# Patient Record
Sex: FEMALE | Race: WHITE | NOT HISPANIC OR LATINO | Employment: FULL TIME | URBAN - METROPOLITAN AREA
[De-identification: names, ages, dates, MRNs, and addresses within clinical notes are randomized per-mention and may not be internally consistent; named-entity substitution may affect disease eponyms.]

---

## 2015-08-26 LAB — HCV AB SER-ACNC: NEGATIVE

## 2023-06-20 RX ORDER — AMLODIPINE BESYLATE 5 MG/1
TABLET ORAL
COMMUNITY
End: 2023-06-21 | Stop reason: SDUPTHER

## 2023-06-20 RX ORDER — ALBUTEROL SULFATE 90 UG/1
AEROSOL, METERED RESPIRATORY (INHALATION)
COMMUNITY
End: 2023-06-21 | Stop reason: SDUPTHER

## 2023-06-20 RX ORDER — MONTELUKAST SODIUM 10 MG/1
TABLET ORAL
COMMUNITY
End: 2023-06-21 | Stop reason: SDUPTHER

## 2023-06-20 RX ORDER — BECLOMETHASONE DIPROPIONATE HFA 40 UG/1
AEROSOL, METERED RESPIRATORY (INHALATION)
COMMUNITY
End: 2023-06-21 | Stop reason: ALTCHOICE

## 2023-06-21 ENCOUNTER — OFFICE VISIT (OUTPATIENT)
Age: 58
End: 2023-06-21

## 2023-06-21 VITALS
BODY MASS INDEX: 34.55 KG/M2 | DIASTOLIC BLOOD PRESSURE: 84 MMHG | HEIGHT: 66 IN | OXYGEN SATURATION: 98 % | HEART RATE: 68 BPM | WEIGHT: 215 LBS | SYSTOLIC BLOOD PRESSURE: 122 MMHG

## 2023-06-21 DIAGNOSIS — E03.9 HYPOTHYROIDISM, UNSPECIFIED TYPE: ICD-10-CM

## 2023-06-21 DIAGNOSIS — J45.20 MILD INTERMITTENT ASTHMA, UNSPECIFIED WHETHER COMPLICATED: ICD-10-CM

## 2023-06-21 DIAGNOSIS — I10 ESSENTIAL (PRIMARY) HYPERTENSION: ICD-10-CM

## 2023-06-21 DIAGNOSIS — K63.5 POLYP OF COLON, UNSPECIFIED PART OF COLON, UNSPECIFIED TYPE: ICD-10-CM

## 2023-06-21 DIAGNOSIS — Z00.00 ROUTINE GENERAL MEDICAL EXAMINATION AT A HEALTH CARE FACILITY: Primary | ICD-10-CM

## 2023-06-21 DIAGNOSIS — R73.01 IMPAIRED FASTING GLUCOSE: ICD-10-CM

## 2023-06-21 DIAGNOSIS — C50.912 INVASIVE DUCTAL CARCINOMA OF BREAST, FEMALE, LEFT (HCC): ICD-10-CM

## 2023-06-21 DIAGNOSIS — K76.0 FATTY LIVER DISEASE, NONALCOHOLIC: ICD-10-CM

## 2023-06-21 DIAGNOSIS — E78.2 MIXED HYPERLIPIDEMIA: ICD-10-CM

## 2023-06-21 DIAGNOSIS — R04.0 EPISTAXIS: ICD-10-CM

## 2023-06-21 DIAGNOSIS — E66.9 OBESITY (BMI 30.0-34.9): ICD-10-CM

## 2023-06-21 PROBLEM — E66.811 OBESITY (BMI 30.0-34.9): Status: ACTIVE | Noted: 2023-06-21

## 2023-06-21 PROBLEM — J45.909 ASTHMA: Status: ACTIVE | Noted: 2023-06-21

## 2023-06-21 PROBLEM — E78.5 HYPERLIPIDEMIA: Status: ACTIVE | Noted: 2023-06-21

## 2023-06-21 LAB — SL AMB POCT HEMOGLOBIN AIC: 5.5 (ref ?–6.5)

## 2023-06-21 PROCEDURE — 99386 PREV VISIT NEW AGE 40-64: CPT | Performed by: NURSE PRACTITIONER

## 2023-06-21 PROCEDURE — 83036 HEMOGLOBIN GLYCOSYLATED A1C: CPT | Performed by: NURSE PRACTITIONER

## 2023-06-21 RX ORDER — ALBUTEROL SULFATE 90 UG/1
2 AEROSOL, METERED RESPIRATORY (INHALATION) EVERY 6 HOURS PRN
Qty: 18 G | Refills: 0 | Status: SHIPPED | OUTPATIENT
Start: 2023-06-21

## 2023-06-21 RX ORDER — MONTELUKAST SODIUM 10 MG/1
10 TABLET ORAL
Qty: 90 TABLET | Refills: 1 | Status: SHIPPED | OUTPATIENT
Start: 2023-06-21

## 2023-06-21 RX ORDER — AMLODIPINE BESYLATE 5 MG/1
5 TABLET ORAL DAILY
Qty: 90 TABLET | Refills: 1 | Status: SHIPPED | OUTPATIENT
Start: 2023-06-21

## 2023-06-21 RX ORDER — LEVOTHYROXINE SODIUM 0.03 MG/1
25 TABLET ORAL DAILY
Qty: 90 TABLET | Refills: 1 | Status: SHIPPED | OUTPATIENT
Start: 2023-06-21

## 2023-06-21 NOTE — PATIENT INSTRUCTIONS
Wellness Visit for Adults   AMBULATORY CARE:   A wellness visit  is when you see your healthcare provider to get screened for health problems  Your healthcare provider will also give you advice on how to stay healthy  Write down your questions so you remember to ask them  Ask your healthcare provider how often you should have a wellness visit  What happens at a wellness visit:  Your healthcare provider will ask about your health, and your family history of health problems  This includes high blood pressure, heart disease, and cancer  He or she will ask if you have symptoms that concern you, if you smoke, and about your mood  You may also be asked about your intake of medicines, supplements, food, and alcohol  Any of the following may be done: Your weight  will be checked  Your height may also be checked so your body mass index (BMI) can be calculated  Your BMI shows if you are at a healthy weight  Your blood pressure  and heart rate will be checked  Your temperature may also be checked  Blood and urine tests  may be done  Blood tests may be done to check your cholesterol levels  Abnormal cholesterol levels increase your risk for heart disease and stroke  You may also need a blood or urine test to check for diabetes if you are at increased risk  Urine tests may be done to look for signs of an infection or kidney disease  A physical exam  includes checking your heartbeat and lungs with a stethoscope  Your healthcare provider may also check your skin to look for sun damage  Screening tests  may be recommended  A screening test is done to check for diseases that may not cause symptoms  The screening tests you may need depend on your age, gender, family history, and lifestyle habits  For example, colorectal screening may be recommended if you are 48years old or older  Screening tests you need if you are a woman:   A Pap smear  is used to screen for cervical cancer   Pap smears are usually done every 3 to 5 years depending on your age  You may need them more often if you have had abnormal Pap smear test results in the past  Ask your healthcare provider how often you should have a Pap smear  A mammogram  is an x-ray of your breasts to screen for breast cancer  Experts recommend mammograms every 2 years starting at age 48 years  You may need a mammogram at age 52 years or younger if you have an increased risk for breast cancer  Talk to your healthcare provider about when you should start having mammograms and how often you need them  Vaccines you may need:   Get an influenza vaccine  every year  The influenza vaccine protects you from the flu  Several types of viruses cause the flu  The viruses change over time, so new vaccines are made each year  Get a tetanus-diphtheria (Td) booster vaccine  every 10 years  This vaccine protects you against tetanus and diphtheria  Tetanus is a severe infection that may cause painful muscle spasms and lockjaw  Diphtheria is a severe bacterial infection that causes a thick covering in the back of your mouth and throat  Get a human papillomavirus (HPV) vaccine  if you are female and aged 23 to 32 or male 23 to 24 and never received it  This vaccine protects you from HPV infection  HPV is the most common infection spread by sexual contact  HPV may also cause vaginal, penile, and anal cancers  Get a pneumococcal vaccine  if you are aged 72 years or older  The pneumococcal vaccine is an injection given to protect you from pneumococcal disease  Pneumococcal disease is an infection caused by pneumococcal bacteria  The infection may cause pneumonia, meningitis, or an ear infection  Get a shingles vaccine  if you are 60 or older, even if you have had shingles before  The shingles vaccine is an injection to protect you from the varicella-zoster virus  This is the same virus that causes chickenpox   Shingles is a painful rash that develops in people who had chickenpox or have been exposed to the virus  How to eat healthy:  My Plate is a model for planning healthy meals  It shows the types and amounts of foods that should go on your plate  Fruits and vegetables make up about half of your plate, and grains and protein make up the other half  A serving of dairy is included on the side of your plate  The amount of calories and serving sizes you need depends on your age, gender, weight, and height  Examples of healthy foods are listed below:  Eat a variety of vegetables  such as dark green, red, and orange vegetables  You can also include canned vegetables low in sodium (salt) and frozen vegetables without added butter or sauces  Eat a variety of fresh fruits , canned fruit in 100% juice, frozen fruit, and dried fruit  Include whole grains  At least half of the grains you eat should be whole grains  Examples include whole-wheat bread, wheat pasta, brown rice, and whole-grain cereals such as oatmeal     Eat a variety of protein foods such as seafood (fish and shellfish), lean meat, and poultry without skin (turkey and chicken)  Examples of lean meats include pork leg, shoulder, or tenderloin, and beef round, sirloin, tenderloin, and extra lean ground beef  Other protein foods include eggs and egg substitutes, beans, peas, soy products, nuts, and seeds  Choose low-fat dairy products such as skim or 1% milk or low-fat yogurt, cheese, and cottage cheese  Limit unhealthy fats  such as butter, hard margarine, and shortening  Exercise:  Exercise at least 30 minutes per day on most days of the week  Some examples of exercise include walking, biking, dancing, and swimming  You can also fit in more physical activity by taking the stairs instead of the elevator or parking farther away from stores  Include muscle strengthening activities 2 days each week  Regular exercise provides many health benefits   It helps you manage your weight, and decreases your risk for type 2 diabetes, heart disease, stroke, and high blood pressure  Exercise can also help improve your mood  Ask your healthcare provider about the best exercise plan for you  General health and safety guidelines:   Do not smoke  Nicotine and other chemicals in cigarettes and cigars can cause lung damage  Ask your healthcare provider for information if you currently smoke and need help to quit  E-cigarettes or smokeless tobacco still contain nicotine  Talk to your healthcare provider before you use these products  Limit alcohol  A drink of alcohol is 12 ounces of beer, 5 ounces of wine, or 1½ ounces of liquor  Lose weight, if needed  Being overweight increases your risk of certain health conditions  These include heart disease, high blood pressure, type 2 diabetes, and certain types of cancer  Protect your skin  Do not sunbathe or use tanning beds  Use sunscreen with a SPF 15 or higher  Apply sunscreen at least 15 minutes before you go outside  Reapply sunscreen every 2 hours  Wear protective clothing, hats, and sunglasses when you are outside  Drive safely  Always wear your seatbelt  Make sure everyone in your car wears a seatbelt  A seatbelt can save your life if you are in an accident  Do not use your cell phone when you are driving  This could distract you and cause an accident  Pull over if you need to make a call or send a text message  Practice safe sex  Use latex condoms if are sexually active and have more than one partner  Your healthcare provider may recommend screening tests for sexually transmitted infections (STIs)  Wear helmets, lifejackets, and protective gear  Always wear a helmet when you ride a bike or motorcycle, go skiing, or play sports that could cause a head injury  Wear protective equipment when you play sports  Wear a lifejacket when you are on a boat or doing water sports      © Copyright [employer] 2022 Information is for End User's use only and may not be sold, redistributed or otherwise used for commercial purposes  The above information is an  only  It is not intended as medical advice for individual conditions or treatments  Talk to your doctor, nurse or pharmacist before following any medical regimen to see if it is safe and effective for you

## 2023-06-21 NOTE — ASSESSMENT & PLAN NOTE
Patient stopped taking Qvar, she reports it was irritating throat, she reports asthma is still controlled without Qvar, she notes rare use of albuterol

## 2023-06-21 NOTE — ASSESSMENT & PLAN NOTE
A1C 5 5 today  Goal to consume 500 to 1,000 fewer calories per day for a 1-2 lbs weight loss per week  Increase exercise to 30 min, 5 times a week as tolerated for a goal of 150 mins a week  Calorie tracking apps such as myfitness pal can be helpful for keeping track of calorie intake  Decrease simple carbohydrates (white bread, pasta, white rice), and increasing vegetables, fruit, and protein  Increase water

## 2023-06-21 NOTE — ASSESSMENT & PLAN NOTE
Patient here today for PE and the establish care  Patient has a hx of invasive ductal carcinoma of left breast  She is no longer following with breast specialist due to not wanting to take hormones  Patient has hx of HTN which is well controlled at this time  Pt has hx of asthma which is well controlled on albuterol, she was taking a daily inhaler, she reports throat irritation with Qvar so she is no longer taking the medication and feels controlled  Patient educated on diet and exercise goal    Patient did have labs completed in labs, labs were reviewed and were unremarkable

## 2023-06-21 NOTE — ASSESSMENT & PLAN NOTE
Controlled on Norvasc 5mg  Discussed low salt diet, increasing exercise to 30 mins 3-4x a week  Check home BP and record for next visit  BP goal <140/90  Discussed ER precautions for chest pain, stroke precautions, or BP of 180/120 or greater (hypertensive crisis), change in LOC or worsening symptoms  Call with new or worsening symptoms  If you have numbness, tingling, weakness, or severe headache with elevated BP go to the ED

## 2023-06-21 NOTE — PROGRESS NOTES
ADULT ANNUAL [facility] WITH [facility]    NAME: [place]  AGE: 59 y o  SEX: female  : 1965     DATE: 2023     Assessment and Plan:     Problem List Items Addressed This Visit        Digestive    Colon polyps     Last colonoscopy          Fatty liver disease, nonalcoholic     Encourage weight loss and exercise, follows with GI            Endocrine    Hypothyroid     TSH normal , continue Synthroid 25mcg             Relevant Medications    levothyroxine (Synthroid) 25 mcg tablet    Impaired fasting glucose     A1C 5 5 today            Respiratory    Asthma     Patient stopped taking Qvar, she reports it was irritating throat, she reports asthma is still controlled without Qvar, she notes rare use of albuterol         Relevant Medications    montelukast (SINGULAIR) 10 mg tablet    albuterol (ProAir HFA) 90 mcg/act inhaler       Cardiovascular and Mediastinum    Essential (primary) hypertension     Controlled on Norvasc 5mg  Discussed low salt diet, increasing exercise to 30 mins 3-4x a week  Check home BP and record for next visit  BP goal <140/90  Discussed ER precautions for chest pain, stroke precautions, or BP of 180/120 or greater (hypertensive crisis), change in LOC or worsening symptoms  Call with new or worsening symptoms  If you have numbness, tingling, weakness, or severe headache with elevated BP go to the ED  Relevant Medications    amLODIPine (NORVASC) 5 mg tablet       Other    Epistaxis     She saw ENT and had nasal cauterization  Hyperlipidemia     Controlled on diet and exercise  Invasive ductal carcinoma of breast, female, left [facility])     Patient no longer following with cancer center, she did not want to take hormones  Obesity (BMI 30 0-34  9)     A1C 5 5 today  Goal to consume 500 to 1,000 fewer calories per day for a 1-2 lbs weight loss per week  Increase exercise to 30 min, 5 times a week as tolerated for a goal of 150 mins a week  Calorie tracking apps such as myfitness pal can be helpful for keeping track of calorie intake  Decrease simple carbohydrates (white bread, pasta, white rice), and increasing vegetables, fruit, and protein  Increase water  Relevant Orders    POCT hemoglobin A1c (Completed)    Routine general medical examination at a health care facility - Primary     Patient here today for PE and the establish care  Patient has a hx of invasive ductal carcinoma of left breast  She is no longer following with breast specialist due to not wanting to take hormones  Patient has hx of HTN which is well controlled at this time  Pt has hx of asthma which is well controlled on albuterol, she was taking a daily inhaler, she reports throat irritation with Qvar so she is no longer taking the medication and feels controlled  Patient educated on diet and exercise goal    Patient did have labs completed in labs, labs were reviewed and were unremarkable  Immunizations and preventive care screenings were discussed with patient today  Appropriate education was printed on patient's after visit summary  Counseling:  Alcohol/drug use: discussed moderation in alcohol intake, the recommendations for healthy alcohol use, and avoidance of illicit drug use  Dental Health: discussed importance of regular tooth brushing, flossing, and dental visits  Injury prevention: discussed safety/seat belts, safety helmets, smoke detectors, carbon dioxide detectors, and smoking near bedding or upholstery  Sexual health: discussed sexually transmitted diseases, partner selection, use of condoms, avoidance of unintended pregnancy, and contraceptive alternatives  Exercise: the importance of regular exercise/physical activity was discussed  Recommend exercise 3-5 times per week for at least 30 minutes  BMI Counseling:  Body mass index is 34 7 kg/m²  The BMI is above normal  Nutrition recommendations include decreasing portion sizes, encouraging healthy choices of fruits and vegetables, decreasing fast food intake, consuming healthier snacks, limiting drinks that contain sugar, moderation in carbohydrate intake, increasing intake of lean protein, reducing intake of saturated and trans fat and reducing intake of cholesterol  Exercise recommendations include moderate physical activity 150 minutes/week  Rationale for BMI follow-up plan is due to patient being overweight or obese  Depression Screening and Follow-up Plan: Patient was screened for depression during today's encounter  They screened negative with a PHQ-2 score of 0  Return in about 6 months (around 12/21/2023) for HTN  Chief Complaint:     Chief Complaint   Patient presents with   • Establish Care   • Annual Exam      History of Present Illness:     Adult Annual Physical   Patient here for a comprehensive physical exam  The patient reports no problems  Diet and Physical Activity  Diet/Nutrition: well balanced diet  Exercise: 3-4 times a week on average  Depression Screening  PHQ-2/9 Depression Screening    Little interest or pleasure in doing things: 0 - not at all  Feeling down, depressed, or hopeless: 0 - not at all  PHQ-2 Score: 0  PHQ-2 Interpretation: Negative depression screen       General Health  Sleep: gets 4-6 hours of sleep on average and gets 7-8 hours of sleep on average  Hearing: normal - bilateral   Vision: most recent eye exam <1 year ago and wears contacts  Dental: regular dental visits and brushes teeth twice daily  /GYN Health  Patient is: postmenopausal  Contraceptive method: Post menopausal      Review of Systems:     Review of Systems   Constitutional: Negative  HENT: Negative  Eyes: Negative  Respiratory: Negative  Cardiovascular: Negative  Gastrointestinal: Negative  Genitourinary: Negative      Musculoskeletal: Negative  Skin: Negative  Neurological: Negative  Hematological: Negative  Past Medical History:     Past Medical History:   Diagnosis Date   • Allergic Most my life   • Arthritis After breaking my right leg in 2008   • Asthma Most of my life   • Cancer [facility]) February 2019    Left Breast   • Disease of thyroid gland For the past couple of decades   • Heart murmur 1980    Benign Heart Murmur   • Hypertension Most of my adult life   • Obesity 2008   • Otitis media Frequent when I was a child   • Urinary tract infection Frequent when I was a child & a young adult   • Visual impairment High School Teenager    Nearsighted      Past Surgical History:     Past Surgical History:   Procedure Laterality Date   • BREAST SURGERY  May 2019    Partial masectomy of left breast   • FRACTURE SURGERY  May 2008    Right Leg   • LYMPH NODE BIOPSY  May 2019    Due to Breast Cancer - Left      Social History:     Social History     Socioeconomic History   • Marital status: /Civil Union     Spouse name: None   • Number of children: None   • Years of education: None   • Highest education level: None   Occupational History   • None   Tobacco Use   • Smoking status: Never     Passive exposure: Yes   • Smokeless tobacco: Never   • Tobacco comments: Only smoked a cigarette here or there when I was a teenager & young adult  Substance and Sexual Activity   • Alcohol use: Yes     Alcohol/week: 2 0 standard drinks of alcohol     Types: 1 Glasses of wine, 1 Standard drinks or equivalent per week     Comment: Only have a drink or two on the weekends  • Drug use: Not Currently     Types: Marijuana     Comment: Just smoked marijuana a few times in my teenage years     • Sexual activity: Yes     Partners: Male     Birth control/protection: Post-menopausal   Other Topics Concern   • None   Social History Narrative   • None     Social Determinants of Health     Financial Resource Strain: Not on file   Food Insecurity: Not on file   Transportation Needs: Not on file   Physical Activity: Not on file   Stress: Not on file   Social Connections: Not on file   Intimate Partner Violence: Not on file   Housing Stability: Not on file      Family History:     Family History   Problem Relation Age of Onset   • Alcohol abuse Mother    • Mental illness Mother         Manic-Depressive   • Coronary artery disease Mother    • Hypertension Mother    • Heart disease Mother         Missing a valve   • Diabetes Mother         Type 2   • COPD Mother    • Cancer Mother         Skin, lungs, pancreas   • Hearing loss Mother    • Anxiety disorder Mother    • Mental illness Father         Paranoid Schizophrenia   • Depression Father    • Hypertension Father    • Heart disease Father         Pacemaker   • Cancer Father         Bone   • Anxiety disorder Father    • Schizophrenia Father    • Suicide Attempts Father    • Alcohol abuse Maternal Grandfather    • Cancer Maternal Grandfather         Brain   • Hypertension Maternal Grandmother    • Colon cancer Maternal Grandmother    • Cancer Maternal Grandmother         Colon   • Glaucoma Maternal Grandmother    • Heart disease Paternal Grandfather         Heart Attack   • Alcohol abuse Brother    • Substance Abuse Brother    • Hypertension Brother    • Heart disease Brother         Slow heart rate   • Diabetes Brother         Type 2   • Alcohol abuse Son    • Mental illness Son         Anxiety/Depression   • Hypertension Son    • Heart disease Son         POTS   • Autoimmune disease Son         POTS   • Alcohol abuse Maternal Uncle    • Mental illness Sister         Anxiety/Depression   • Asthma Sister    • Anxiety disorder Sister    • Heart disease Paternal Uncle         Heart Attack   • Asthma Daughter    • Autoimmune disease Cousin         Fibromyalga   • Breast cancer Cousin    • Breast cancer Maternal Aunt    • Breast cancer Paternal Aunt       Current Medications:     Current Outpatient Medications   Medication "Sig Dispense Refill   • albuterol (ProAir HFA) 90 mcg/act inhaler Inhale 2 puffs every 6 (six) hours as needed for wheezing 18 g 0   • amLODIPine (NORVASC) 5 mg tablet Take 1 tablet (5 mg total) by mouth daily 90 tablet 1   • levothyroxine (Synthroid) 25 mcg tablet Take 1 tablet (25 mcg total) by mouth daily 90 tablet 1   • montelukast (SINGULAIR) 10 mg tablet Take 1 tablet (10 mg total) by mouth daily at bedtime 90 tablet 1     No current facility-administered medications for this visit  Allergies: Allergies   Allergen Reactions   • Covid-19 Mrna Vacc (Moderna) Chest Pain, Dizziness, Drowsiness, Fatigue, Itching, Lightheadedness, Palpitations, Rash, Shortness Of Breath and Throat Swelling   • Dust Mite Extract Drowsiness, Fatigue, Itching, Rash, Shortness Of Breath and Throat Swelling   • Hydrocodone-Acetaminophen Dizziness, Lightheadedness, Palpitations and Shortness Of Breath   • Influenza Virus Vaccine Chest Pain, Dizziness, Fatigue, Itching, Lightheadedness, Palpitations, Rash and Shortness Of Breath   • Medical Tape Drowsiness, Hives, Itching, Rash and Shortness Of Breath   • Molds & Smuts Fatigue, Itching, Lightheadedness, Nasal Congestion, Rash, Shortness Of Breath and Throat Swelling   • Morphine Vomiting      Physical Exam:     /84 (BP Location: Right arm, Patient Position: Sitting, Cuff Size: Standard)   Pulse 68   Ht 5' 6\" (1 676 m)   Wt 97 5 kg (215 lb)   SpO2 98%   BMI 34 70 kg/m²     Physical Exam  Vitals and nursing note reviewed  Constitutional:       General: She is not in acute distress  Appearance: Normal appearance  She is well-developed  HENT:      Head: Normocephalic and atraumatic  Right Ear: Tympanic membrane, ear canal and external ear normal       Left Ear: Tympanic membrane, ear canal and external ear normal       Nose: Nose normal       Mouth/Throat:      Mouth: Mucous membranes are moist       Pharynx: No oropharyngeal exudate     Eyes:      General:    " Right eye: No discharge  Left eye: No discharge  Conjunctiva/sclera: Conjunctivae normal    Cardiovascular:      Rate and Rhythm: Normal rate and regular rhythm  Heart sounds: Normal heart sounds  No murmur heard  Pulmonary:      Effort: Pulmonary effort is normal  No respiratory distress  Breath sounds: Normal breath sounds  Abdominal:      General: Bowel sounds are normal       Palpations: Abdomen is soft  Tenderness: There is no abdominal tenderness  Musculoskeletal:         General: No swelling  Cervical back: Neck supple  Right lower leg: No edema  Left lower leg: No edema  Skin:     General: Skin is warm and dry  Capillary Refill: Capillary refill takes less than 2 seconds  Neurological:      Mental Status: She is alert and oriented to person, place, and time  Psychiatric:         Mood and Affect: Mood normal          Behavior: Behavior normal          Thought Content:  Thought content normal          Judgment: Judgment normal           [facility]  [id number] WITH [facility]

## 2023-07-05 ENCOUNTER — TELEPHONE (OUTPATIENT)
Dept: ADMINISTRATIVE | Facility: OTHER | Age: 58
End: 2023-07-05

## 2023-07-05 NOTE — TELEPHONE ENCOUNTER
----- Message from Hellen Kathleen sent at 7/5/2023 12:18 PM EDT -----  Regarding: care gap request  01/04/23 1:48 PM    Hello, our patient attached above CRC: Colonoscopy completed/performed. Please assist in updating the patient chart by pulling the document from the Media Tab. The date of service is 6/30/23.      Thank you,  Hellen Kathleen  [facility]

## 2023-07-05 NOTE — TELEPHONE ENCOUNTER
Upon review of the In Basket request we have found/obtained the documentation. After careful review of the document we are unable to complete this request for CRC: Colonoscopy because b there is no colonoscopy report, there is only a patholgy report and the colonoscopy was completed in 2012 which we would not outreach for due to colonoscopy being out if the measurement year. Any additional questions or concerns should be emailed to the Practice Liaisons via the appropriate education email address, please do not reply via In Basket.     Thank you  Gerry Donis

## 2023-07-14 PROBLEM — D22.62: Status: ACTIVE | Noted: 2023-07-14

## 2023-07-14 PROBLEM — C44.91 BASAL CELL CARCINOMA: Status: ACTIVE | Noted: 2023-07-14

## 2023-07-15 DIAGNOSIS — J45.20 MILD INTERMITTENT ASTHMA, UNSPECIFIED WHETHER COMPLICATED: ICD-10-CM

## 2023-07-15 RX ORDER — ALBUTEROL SULFATE 90 UG/1
AEROSOL, METERED RESPIRATORY (INHALATION)
Qty: 18 G | Refills: 0 | Status: SHIPPED | OUTPATIENT
Start: 2023-07-15

## 2023-08-11 DIAGNOSIS — J45.20 MILD INTERMITTENT ASTHMA, UNSPECIFIED WHETHER COMPLICATED: ICD-10-CM

## 2023-08-11 RX ORDER — ALBUTEROL SULFATE 90 UG/1
AEROSOL, METERED RESPIRATORY (INHALATION)
OUTPATIENT
Start: 2023-08-11

## 2023-08-11 RX ORDER — ALBUTEROL SULFATE 90 UG/1
2 AEROSOL, METERED RESPIRATORY (INHALATION) EVERY 6 HOURS PRN
Qty: 18 G | Refills: 0 | Status: SHIPPED | OUTPATIENT
Start: 2023-08-11

## 2023-08-11 NOTE — TELEPHONE ENCOUNTER
Refilled albuterol. Patient was called and voicemail left for her to return call to discuss overuse of albuterol. I would like to see patient to discuss a better maintainece medication for her asthma for better long term outcomes.

## 2023-08-20 PROBLEM — Z00.00 ROUTINE GENERAL MEDICAL EXAMINATION AT A HEALTH CARE FACILITY: Status: RESOLVED | Noted: 2023-06-21 | Resolved: 2023-08-20

## 2023-12-06 ENCOUNTER — TELEMEDICINE (OUTPATIENT)
Age: 58
End: 2023-12-06

## 2023-12-06 DIAGNOSIS — U07.1 COVID-19: Primary | ICD-10-CM

## 2023-12-06 PROCEDURE — 99213 OFFICE O/P EST LOW 20 MIN: CPT | Performed by: NURSE PRACTITIONER

## 2023-12-06 NOTE — PATIENT INSTRUCTIONS
COVID-19 (Coronavirus Disease 2019)   WHAT YOU NEED TO KNOW:   COVID-19 is the disease caused by a coronavirus first discovered in December 2019. The virus can be spread starting 2 to 3 days before symptoms begin. The virus has changed into several new forms (called variants) since it was discovered. A variant may be more easily spread or cause more severe illness than the original form. DISCHARGE INSTRUCTIONS:   Call your local emergency number (911 in the [address]) if:   You have trouble breathing or shortness of breath at rest.    You have chest pain or pressure that lasts longer than 5 minutes. You become confused or hard to wake. Return to the emergency department if:   The skin on your face, fingers, or toes look blue or darker than usual.      Call your doctor if:   You have a fever. You have questions or concerns about your condition or care. Medicines: You may need any of the following:  Decongestants  help reduce nasal congestion and help you breathe more easily. If you take decongestant pills, they may make you feel restless or cause problems with your sleep. Do not use decongestant sprays for more than a few days. Cough suppressants  help reduce coughing. Ask your healthcare provider which type of cough medicine is best for you. Acetaminophen  decreases pain and fever. It is available without a doctor's order. Ask how much to take and how often to take it. Follow directions. Read the labels of all other medicines you are using to see if they also contain acetaminophen, or ask your doctor or pharmacist. Acetaminophen can cause liver damage if not taken correctly. NSAIDs , such as ibuprofen, help decrease swelling, pain, and fever. This medicine is available with or without a doctor's order. NSAIDs can cause stomach bleeding or kidney problems in certain people. If you take blood thinner medicine, always ask your healthcare provider if NSAIDs are safe for you.  Always read the medicine label and follow directions. Blood thinners  help prevent blood clots. Clots can cause strokes, heart attacks, and death. Many types of blood thinners are available. Your healthcare provider will give you specific instructions for the type you are given. The following are general safety guidelines to follow while you are taking a blood thinner:    Watch for bleeding and bruising. Watch for bleeding from your gums or nose. Watch for blood in your urine and bowel movements. Use a soft washcloth on your skin, and a soft toothbrush to brush your teeth. This can keep your skin and gums from bleeding. If you shave, use an electric shaver. Do not play contact sports. Tell your dentist and other healthcare providers that you take a blood thinner. Wear a bracelet or necklace that says you take this medicine. Do not start or stop any other medicines or supplements unless your healthcare provider tells you to. Many medicines and supplements cannot be used with blood thinners. Take your blood thinner exactly as prescribed by your healthcare provider. Do not skip does or take less than prescribed. Tell your provider right away if you forget to take your blood thinner, or if you take too much. Take your medicine as directed. Contact your healthcare provider if you think your medicine is not helping or if you have side effects. Tell your provider if you are allergic to any medicine. Keep a list of the medicines, vitamins, and herbs you take. Include the amounts, and when and why you take them. Bring the list or the pill bottles to follow-up visits. Carry your medicine list with you in case of an emergency. What you need to know about COVID-19 vaccines:  Healthcare providers recommend vaccination, even if you already had COVID-19. Get a COVID-19 vaccine as directed. Vaccination is recommended for everyone 6 months or older. COVID-19 vaccines are given as a shot in 1 to 3 doses as a primary series.  This depends on the vaccine brand and the age of the person who receives it. A booster dose is recommended for everyone 5 years or older after the primary series is complete. A second booster  is recommended for all adults 48 or older and for immunocompromised adolescents. The second booster is also recommended for anyone who got the 1-dose brand of vaccine for the first dose and a booster. Your provider can give you more information on boosters and help you schedule all needed doses. Continue to protect yourself and others. You can become infected even after you get the vaccine. You may also be able to pass the virus to others without knowing you are infected. After you get the vaccine, check local, national, and international travel rules. You may need to be tested before you travel. Some countries require proof of a negative test before you travel. You may also need to quarantine after you return. Medicine may be given to prevent infection. The medicine can be given if you are at high risk for infection and cannot get the vaccine. It can also be given if your immune system does not respond well to the vaccine. How the 2019 coronavirus spreads:  Close personal contact with an infected person increases your risk for infection. This means being within 6 feet (2 meters) of the person for at least 15 minutes over 24 hours. The virus travels in droplets that form when a person talks, sings, coughs, or sneezes. The droplets can also float in the air for minutes or hours. Infection happens when you breathe in the droplets or get them in your eyes or nose. Person-to-person contact can spread the virus. For example, a person with the virus on his or her hands can spread it by shaking hands with someone. The virus can stay on objects and surfaces for hours to days. You may become infected by touching the object or surface and then touching your eyes or mouth.     Help lower your risk for COVID-19 during an active outbreak:   Stay home if you are sick or think you may have COVID-19. It is important to stay home if you are waiting for a testing appointment or for test results. Wash your hands often throughout the day. Use soap and water. Rub your soapy hands together, lacing your fingers, for at least 20 seconds. Rinse with warm, running water. Dry your hands with a clean towel or paper towel. Use hand  that contains alcohol if soap and water are not available. Teach children how to wash their hands and use hand . Cover sneezes and coughs. Turn your face away and cover your mouth and nose with a tissue. Throw the tissue away. Use the bend of your arm if a tissue is not available. Then wash your hands well with soap and water or use hand . Teach children how to cover a cough or sneeze. Wear a face covering (mask) when needed. Use a cloth covering with at least 2 layers. You can also create layers by putting a cloth covering over a disposable non-medical mask. Cover your mouth and your nose. Try to keep space between you and others when you are out of the house. Avoid crowds as much as possible. Wear a face covering when you must be around a large group and cannot keep space between you and others. Clean and disinfect high-touch surfaces and objects often. Use disinfecting wipes, or make a solution of 4 teaspoons of bleach in 1 quart (4 cups) of water. Ask about other vaccines you may need. Get the influenza (flu) vaccine as soon as recommended each year, usually starting in September or October. Get the pneumonia vaccine if recommended. Your healthcare provider can tell you if you should also get other vaccines, and when to get them. Follow up with your doctor as directed:  Write down your questions so you remember to ask them during your visits.   For more information:   [facility]  [address] [address]  [place] [address]  Phone: [phone]  Web Address: Sqoot.br    © Copyright [employer]ative 2023 Information is for End User's use only and may not be sold, redistributed or otherwise used for commercial purposes. The above information is an  only. It is not intended as medical advice for individual conditions or treatments. Talk to your doctor, nurse or pharmacist before following any medical regimen to see if it is safe and effective for you.

## 2023-12-06 NOTE — ASSESSMENT & PLAN NOTE
Patient tested positive for COVID 1 day ago. We discussed SE and use of Paxlovid/antivirals but she declines medication at this time. She has been taking Tylenol, daytime cold and flu and night time cold and flu as needed for sx. She denies SOB, chest pain. She denies needing albuterol inhaler. We discussed quarantine guidelines and masking. We discussed sx which warrant emergent f/u. Patient verbalized understanding and is in agreement with plan. Please call the office with new or worsening sx. Work note provided.

## 2023-12-06 NOTE — LETTER
December 6, 2023     Patient: Jovan Casas  YOB: 1965  Date of Visit: 12/6/2023      To Whom it May Concern:    Jovan Casas is under my professional care. Wilmon Hammans was seen in my office on 12/6/2023. Wilmon Hammans may return to work on 12/11/2023 . If you have any questions or concerns, please don't hesitate to call.          Sincerely,          [facility] Childers        CC: No Recipients

## 2023-12-06 NOTE — PROGRESS NOTES
COVID-19 Outpatient Progress Note    Assessment/Plan:    Problem List Items Addressed This Visit        Other    COVID-19 - Primary     Patient tested positive for COVID 1 day ago. We discussed SE and use of Paxlovid/antivirals but she declines medication at this time. She has been taking Tylenol, daytime cold and flu and night time cold and flu as needed for sx. She denies SOB, chest pain. She denies needing albuterol inhaler. We discussed quarantine guidelines and masking. We discussed sx which warrant emergent f/u. Patient verbalized understanding and is in agreement with plan. Please call the office with new or worsening sx. Work note provided. Disposition:     Discussed symptom directed medication options with patient. Discussed vitamin D, vitamin C, and/or zinc supplementation with patient. I have spent a total time of 18 minutes on the day of the encounter for this patient including discussing diagnostic results, discussing prognosis and risks and benefits of treatment options. Encounter provider: NGOC Simons     Provider located at: [place] [address]  [phone]     Recent Visits  No visits were found meeting these conditions. Showing recent visits within past 7 days and meeting all other requirements  Today's Visits  Date Type Provider Dept   12/06/23 Telemedicine Summer Kaufman, [facility]   Showing today's visits and meeting all other requirements  Future Appointments  No visits were found meeting these conditions. Showing future appointments within next 150 days and meeting all other requirements     This virtual check-in was done via [address] [place] [address] and patient was informed that this is a secure, HIPAA-compliant platform. She agrees to proceed.     Patient agrees to participate in a virtual check in via telephone or video visit instead of presenting to the office to address urgent/immediate medical needs. Patient is aware this is a billable service. She acknowledged consent and understanding of privacy and security of the video platform. The patient has agreed to participate and understands they can discontinue the visit at any time. After connecting through [facility], the patient was identified by name and date of birth. Елена Ham was informed that this was a telemedicine visit and that the exam was being conducted confidentially over secure lines. My office door was closed. No one else was in the room. Елена Ham acknowledged consent and understanding of privacy and security of the telemedicine visit. I informed the patient that I have reviewed her record in Epic and presented the opportunity for her to ask any questions regarding the visit today. The patient agreed to participate. Verification of patient location:  Patient is located in the following state in which I hold an active license: [place]    Subjective:   Елена Ham is a 59 y.o. female who is concerned about COVID-19. Patient's symptoms include fever, chills, fatigue, nasal congestion, sore throat, cough, shortness of breath and chest tightness. Patient denies malaise, rhinorrhea, anosmia, loss of taste, abdominal pain, nausea, vomiting, diarrhea, myalgias and headaches. - Date of symptom onset: 12/4/2023      COVID-19 vaccination status: Fully vaccinated (primary series)    Lab Results   Component Value Date    SARSCOV2 Detected (A) 08/02/2021       Review of Systems   Constitutional:  Positive for chills, fatigue and fever. HENT:  Positive for congestion and sore throat. Negative for rhinorrhea. Respiratory:  Positive for cough, chest tightness and shortness of breath. Gastrointestinal:  Negative for abdominal pain, diarrhea, nausea and vomiting. Musculoskeletal:  Negative for myalgias. Neurological:  Negative for headaches.      Current Outpatient Medications on File Prior to Visit   Medication Sig   • albuterol (PROVENTIL HFA,VENTOLIN HFA) 90 mcg/act inhaler Inhale 2 puffs every 6 (six) hours as needed for wheezing or shortness of breath   • amLODIPine (NORVASC) 5 mg tablet Take 1 tablet (5 mg total) by mouth daily   • levothyroxine (Synthroid) 25 mcg tablet Take 1 tablet (25 mcg total) by mouth daily   • montelukast (SINGULAIR) 10 mg tablet Take 1 tablet (10 mg total) by mouth daily at bedtime       Objective: There were no vitals taken for this visit. Physical Exam  Constitutional:       General: She is not in acute distress. Appearance: Normal appearance. She is ill-appearing. She is not toxic-appearing or diaphoretic. HENT:      Head: Normocephalic and atraumatic. Eyes:      General:         Right eye: No discharge. Left eye: No discharge. Pulmonary:      Effort: Pulmonary effort is normal. No respiratory distress. Skin:     Coloration: Skin is not jaundiced or pale. Findings: No bruising, erythema, lesion or rash. Neurological:      Mental Status: She is alert and oriented to person, place, and time. Psychiatric:         Mood and Affect: Mood normal.         Behavior: Behavior normal.         Thought Content:  Thought content normal.         Judgment: Judgment normal.       NGOC Linda

## 2024-01-10 DIAGNOSIS — I10 ESSENTIAL (PRIMARY) HYPERTENSION: ICD-10-CM

## 2024-01-10 RX ORDER — AMLODIPINE BESYLATE 5 MG/1
5 TABLET ORAL DAILY
Qty: 90 TABLET | Refills: 0 | Status: SHIPPED | OUTPATIENT
Start: 2024-01-10

## 2024-01-17 DIAGNOSIS — E03.9 HYPOTHYROIDISM, UNSPECIFIED TYPE: ICD-10-CM

## 2024-01-17 RX ORDER — LEVOTHYROXINE SODIUM 0.03 MG/1
25 TABLET ORAL DAILY
Qty: 90 TABLET | Refills: 1 | Status: SHIPPED | OUTPATIENT
Start: 2024-01-17

## 2024-01-23 ENCOUNTER — OFFICE VISIT (OUTPATIENT)
Age: 59
End: 2024-01-23

## 2024-01-23 VITALS
HEIGHT: 66 IN | OXYGEN SATURATION: 98 % | BODY MASS INDEX: 34.04 KG/M2 | HEART RATE: 57 BPM | SYSTOLIC BLOOD PRESSURE: 118 MMHG | DIASTOLIC BLOOD PRESSURE: 62 MMHG | TEMPERATURE: 98.5 F | WEIGHT: 211.8 LBS

## 2024-01-23 DIAGNOSIS — I10 ESSENTIAL (PRIMARY) HYPERTENSION: Primary | ICD-10-CM

## 2024-01-23 DIAGNOSIS — J45.20 MILD INTERMITTENT ASTHMA, UNSPECIFIED WHETHER COMPLICATED: ICD-10-CM

## 2024-01-23 DIAGNOSIS — Z12.31 SCREENING MAMMOGRAM, ENCOUNTER FOR: ICD-10-CM

## 2024-01-23 DIAGNOSIS — Z12.4 CERVICAL CANCER SCREENING: ICD-10-CM

## 2024-01-23 DIAGNOSIS — M25.562 CHRONIC PAIN OF LEFT KNEE: ICD-10-CM

## 2024-01-23 DIAGNOSIS — G89.29 CHRONIC PAIN OF LEFT KNEE: ICD-10-CM

## 2024-01-23 DIAGNOSIS — E66.9 OBESITY (BMI 30.0-34.9): ICD-10-CM

## 2024-01-23 DIAGNOSIS — C50.912 INVASIVE DUCTAL CARCINOMA OF BREAST, FEMALE, LEFT (HCC): ICD-10-CM

## 2024-01-23 DIAGNOSIS — E03.9 HYPOTHYROIDISM, UNSPECIFIED TYPE: ICD-10-CM

## 2024-01-23 PROCEDURE — 99214 OFFICE O/P EST MOD 30 MIN: CPT | Performed by: NURSE PRACTITIONER

## 2024-01-23 NOTE — ASSESSMENT & PLAN NOTE
Left medial knee pain for past few months only occurring at night time. During day it is tender to the touch, but not pain. At night pain is a throbbing pain which wakes her up out of her sleep.  Normal range of motion noted on exam today.  Point tenderness noted.  Will order x-ray to rule out bony abnormalities, and refer to physical therapy.  Advised patient on Voltaren gel as needed before bed.

## 2024-01-24 NOTE — ASSESSMENT & PLAN NOTE
Blood pressure well-controlled at this time on amlodipine 5 mg.  Discussed low salt diet, increasing exercise to 30 mins 3-4x a week. Check home BP and record for next visit. BP goal <140/90. Discussed ER precautions for chest pain, stroke precautions, or BP of 180/120 or greater (hypertensive crisis), change in LOC or worsening symptoms. Call with new or worsening symptoms.   If you have numbness, tingling, weakness, or severe headache with elevated BP go to the ED.

## 2024-01-24 NOTE — PROGRESS NOTES
Name: Pam Montalvo      : 1965      MRN: [id number]  Encounter Provider: NGOC Bates  Encounter Date: 2024   Encounter department: [facility]    Assessment & Plan     1. Essential (primary) hypertension  Assessment & Plan:  Blood pressure well-controlled at this time on amlodipine 5 mg.  Discussed low salt diet, increasing exercise to 30 mins 3-4x a week. Check home BP and record for next visit. BP goal <140/90. Discussed ER precautions for chest pain, stroke precautions, or BP of 180/120 or greater (hypertensive crisis), change in LOC or worsening symptoms. Call with new or worsening symptoms.   If you have numbness, tingling, weakness, or severe headache with elevated BP go to the ED.        2. Chronic pain of left knee  Assessment & Plan:  Left medial knee pain for past few months only occurring at night time. During day it is tender to the touch, but not pain. At night pain is a throbbing pain which wakes her up out of her sleep.  Normal range of motion noted on exam today.  Point tenderness noted.  Will order x-ray to rule out bony abnormalities, and refer to physical therapy.  Advised patient on Voltaren gel as needed before bed.    Orders:  -     XR knee 3 vw left non injury  -     Ambulatory Referral to Physical Therapy; Future    3. Invasive ductal carcinoma of breast, female, left (HCC)  -     Mammo diagnostic right w cad; Future; Expected date: 2024  -     Mammo diagnostic left w cad; Future; Expected date: 2024  -     US breast right limited (diagnostic); Future; Expected date: 2024  -     US breast left limited (diagnostic); Future; Expected date: 2024    4. Screening mammogram, encounter for  -     Mammo screening bilateral w 3d & cad; Future; Expected date: 2024  -     Mammo diagnostic right w cad; Future; Expected date: 2024  -     Mammo diagnostic left w cad; Future;  "Expected date: 01/23/2024  -     US breast right limited (diagnostic); Future; Expected date: 01/23/2024  -     US breast left limited (diagnostic); Future; Expected date: 01/23/2024    5. Cervical cancer screening  -     Ambulatory Referral to Obstetrics / Gynecology; Future    6. Hypothyroidism, unspecified type  Assessment & Plan:  Patient currently taking Synthroid 25 mg.  No concerns at this time.  TSH in normal range.      7. Mild intermittent asthma, unspecified whether complicated  Assessment & Plan:  Well-controlled rare use of albuterol, and Singulair daily.      8. Obesity (BMI 30.0-34.9)  Assessment & Plan:  Goal to consume 500 to 1,000 fewer calories per day for a 1-2 lbs weight loss per week. Increase exercise to 30 min, 5 times a week as tolerated for a goal of 150 mins a week. Calorie tracking apps such as myfitness pal can be helpful for keeping track of calorie intake. Decrease simple carbohydrates (white bread, pasta, white rice), and increasing vegetables, fruit, and protein.  Increase water.               Subjective      Patient here today for follow-up.  Patient has concerns for medial left knee pain which occurs at night.  Patient reports left knee pain is a throbbing pain which wakes her up from sleep.  Patient denies injury to knee.  Patient reports throughout the day she has point tenderness on the medial left knee, but at night she has throbbing sharp pain.  Patient has not taken any medication for pain.  Patient reports this has been occurring for the past \"few months \".      Review of Systems   Constitutional: Negative.    HENT: Negative.     Eyes: Negative.    Respiratory: Negative.     Cardiovascular: Negative.    Gastrointestinal: Negative.    Endocrine: Negative.    Genitourinary: Negative.    Musculoskeletal:  Negative for back pain, gait problem, myalgias, neck pain and neck stiffness.        Left knee pain   Skin: Negative.    Allergic/Immunologic: Negative.    Neurological: " "Negative.    Hematological: Negative.    Psychiatric/Behavioral: Negative.         Current Outpatient Medications on File Prior to Visit   Medication Sig   • albuterol (PROVENTIL HFA,VENTOLIN HFA) 90 mcg/act inhaler Inhale 2 puffs every 6 (six) hours as needed for wheezing or shortness of breath   • amLODIPine (NORVASC) 5 mg tablet Take 1 tablet (5 mg total) by mouth daily   • levothyroxine (Synthroid) 25 mcg tablet Take 1 tablet (25 mcg total) by mouth daily   • montelukast (SINGULAIR) 10 mg tablet Take 1 tablet (10 mg total) by mouth daily at bedtime       Objective     /62 (BP Location: Right arm, Patient Position: Sitting, Cuff Size: Large)   Pulse 57   Temp 98.5 °F (36.9 °C)   Ht 5' 6\" (1.676 m)   Wt 96.1 kg (211 lb 12.8 oz)   SpO2 98%   BMI 34.19 kg/m²     Physical Exam  Vitals and nursing note reviewed.   Constitutional:       General: She is not in acute distress.     Appearance: Normal appearance.   HENT:      Head: Normocephalic and atraumatic.      Right Ear: External ear normal.      Left Ear: External ear normal.      Nose: Nose normal.   Eyes:      General:         Right eye: No discharge.         Left eye: No discharge.      Conjunctiva/sclera: Conjunctivae normal.   Cardiovascular:      Rate and Rhythm: Normal rate and regular rhythm.      Heart sounds: Normal heart sounds. No murmur heard.  Pulmonary:      Effort: Pulmonary effort is normal.      Breath sounds: Normal breath sounds.   Abdominal:      General: Bowel sounds are normal.      Palpations: Abdomen is soft.   Musculoskeletal:         General: Normal range of motion.      Cervical back: Normal range of motion.      Right knee: Normal.      Left knee: No swelling, effusion, erythema or bony tenderness. Normal range of motion. Tenderness present over the medial joint line.      Instability Tests: Anterior drawer test negative. Posterior drawer test negative. Anterior Lachman test negative. Medial Paula test negative.      Right " lower leg: No edema.      Left lower leg: No edema.   Lymphadenopathy:      Cervical: No cervical adenopathy.   Skin:     General: Skin is warm and dry.   Neurological:      Mental Status: She is alert and oriented to person, place, and time.   Psychiatric:         Mood and Affect: Mood normal.         Behavior: Behavior normal.         Thought Content: Thought content normal.         Judgment: Judgment normal.       [facility]OC Bates

## 2024-02-27 ENCOUNTER — EVALUATION (OUTPATIENT)
Age: 59
End: 2024-02-27
Payer: COMMERCIAL

## 2024-02-27 DIAGNOSIS — G89.29 CHRONIC PAIN OF LEFT KNEE: ICD-10-CM

## 2024-02-27 DIAGNOSIS — M25.562 CHRONIC PAIN OF LEFT KNEE: ICD-10-CM

## 2024-02-27 PROCEDURE — 97110 THERAPEUTIC EXERCISES: CPT | Performed by: PHYSICAL THERAPIST

## 2024-02-27 PROCEDURE — 97161 PT EVAL LOW COMPLEX 20 MIN: CPT | Performed by: PHYSICAL THERAPIST

## 2024-02-27 NOTE — PROGRESS NOTES
PT Evaluation     Today's date: 2024  Patient name: Pam Montalvo  : 1965  MRN: [id number]  Referring provider: Crystal Hoyt*  Dx:   Encounter Diagnosis     ICD-10-CM    1. Chronic pain of left knee  M25.562 Ambulatory Referral to Physical Therapy    G89.29           Start Time: 1615  Stop Time: 1715  Total time in clinic (min): 60 minutes    Assessment  Assessment details: Pam Montalvo presents to PT with chronic Hx of insidious onset L knee pain. Significant findings from examination include: mild extension deficit, TTP medial joint line, strong/painful contractile testing. These findings are consistent with referring Dx of medial knee DJD, limiting their functional mobility. Pt would benefit from course of PT to resolve the above mentioned impairments and facilitate return to PLOF.     Impairments: abnormal or restricted ROM, impaired physical strength, lacks appropriate home exercise program and pain with function    Symptom irritability: moderateUnderstanding of Dx/Px/POC: good   Prognosis: good    Goals  Short Term Functional Goals:   In 3 weeks patient will:   Patient to be independent with HEP to maximize improvement in functional mobility.   Decrease L Knee pain to 3 on a scale of 0-10 to allow dressing and improved stair climbing.    pt will increase FOTO score by 10pts to reflect a statistically significant improvement.        Long Term Functional Goals (Goals for patient at discharge):    In 6 weeks patient will:   Improve functional mobility: Patient will stand for 90 minutes and walk 2 blocks with less than 3/10 pain.   Patient will ascend/descend 2 flight of stairs with less than 2/10 pain.   pt will score at or above predicted discharge FOTO score of 80 to reflect improvement in subjective functional ability.       Plan  Patient would benefit from: skilled physical therapy  Referral necessary: No  Planned therapy interventions: manual therapy, neuromuscular re-education,  "patient education, therapeutic activities, therapeutic exercise and home exercise program  Frequency: 2x week  Duration in weeks: 6  Plan of Care beginning date: 2024  Plan of Care expiration date: 2024  Treatment plan discussed with: patient      Subjective Evaluation    History of Present Illness  Date of onset: 2023  Mechanism of injury: Pam Montalvo is a 58 y.o. y/o female who reports chronic Hx of L knee pain at night which started gradually over months s clear onset. CC is pain at night disturbing sleep. Symptoms aggravated by getting up from floor and low chairs, walking, and stairs. Symptoms improved by moving.   Symptoms change throughout the day: Yes (worsening)   Relevant Surgical Hx: none  Pacemaker:  No   Cancer: Yes (Breast)     Patient Goals  Patient goals for therapy: decreased pain, independence with ADLs/IADLs and return to sport/leisure activities    Pain  Current pain ratin  At best pain ratin  At worst pain ratin  Quality: burning and throbbing  Relieving factors: change in position  Aggravating factors: stair climbing  Progression: worsening      Diagnostic Tests  X-ray: abnormal (Medial compartment DJD)        Objective     Tenderness   Left Knee   Tenderness in the medial joint line.     Passive Range of Motion   Left Knee   Flexion: 125 degrees   Extension: -5 degrees with pain    Right Knee   Flexion: 130 degrees   Extension: 0 degrees     Strength/Myotome Testing     Left Knee   Left knee flexion strength: 55lb.  Left knee extension strength: 65lb.    Right Knee   Right knee flexion strength: 54lb.  Right knee extension strength: 65lb.    Additional Strength Details  30\" sq test - 10 reps    Tests     Left Knee   Negative lateral Paula, medial Paula, valgus stress test at 0 degrees, valgus stress test at 30 degrees, varus stress test at 0 degrees and varus stress test at 30 degrees.              Precautions: Hx of CA     Daily Treatment Diary:      Initial " "Evaluation Date: 02/27/24  Compliance 2/27                     Visit Number 1                    Re-Eval  IE                 MC   Foto Captured y                           2/27                     Manual                                                                                        Ther-Ex                      PROM                      [place]/QS                      4-way SLR X10 ea                     Prone TKE 10x5\"            Straight Leg bridge                      Std Hip ABD/Ext                      Partial Sq 2x10                                                                                       Edu                      Neuro Re-Ed                      SLS Balance                      SLS A/P pivot             Lat Step Down 8in   x10                                      Ther-Act                                                               Modalities                                                                              "

## 2024-03-07 ENCOUNTER — OFFICE VISIT (OUTPATIENT)
Age: 59
End: 2024-03-07
Payer: COMMERCIAL

## 2024-03-07 DIAGNOSIS — M25.562 CHRONIC PAIN OF LEFT KNEE: Primary | ICD-10-CM

## 2024-03-07 DIAGNOSIS — G89.29 CHRONIC PAIN OF LEFT KNEE: Primary | ICD-10-CM

## 2024-03-07 PROCEDURE — 97112 NEUROMUSCULAR REEDUCATION: CPT | Performed by: PHYSICAL THERAPIST

## 2024-03-07 PROCEDURE — 97110 THERAPEUTIC EXERCISES: CPT | Performed by: PHYSICAL THERAPIST

## 2024-03-07 NOTE — PROGRESS NOTES
"Daily Note     Today's date: 3/7/2024  Patient name: Pam Montalvo  : 1965  MRN: [id number]  Referring provider: Crystal Hoyt*  Dx:   Encounter Diagnosis     ICD-10-CM    1. Chronic pain of left knee  M25.562     G89.29           Start Time: 1630  Stop Time: 1715  Total time in clinic (min): 45 minutes    Subjective: Pt reports no sig change from IE. Following HEP. Had increased pain on  so she took Monday off from exercise. Pain reported as 2/10 at start of session.         Objective: See treatment diary below        Assessment: pt demonstrates good initial response to POC. Exercises added to improve LE strength and activity tolerance.  Pt would benefit from continued skilled PT to resolve remaining functional impairments and facilitate return to PLOF.       Plan: Continue per plan of care.  Progress treatment as tolerated.         Precautions: Hx of CA     Daily Treatment Diary:      Initial Evaluation Date: 24  Compliance 2/27  3/7                   Visit Number 1 2                   Re-Eval  IE                 MC   Foto Captured y                           2/27  3/7                   Manual                                                                                        Ther-Ex                      PROM                                            4-way SLR X10 ea  2# 2x10 ea                   Prone TKE 10x5\" 2x10 5\" ea           Straight Leg bridge   Gurpreet 2x10 5\" ea                   Std Hip ABD/Ext   2x10 ea (B)                   Partial Sq 2x10  3x10                                                                                     Edu                      Neuro Re-Ed                      SLS Balance   10x10\"                   SLS A/P pivot             Lat Step Down 8in   x10 6in 3x10                                      Ther-Act                                                               Modalities                                                                     "

## 2024-03-09 DIAGNOSIS — J45.20 MILD INTERMITTENT ASTHMA, UNSPECIFIED WHETHER COMPLICATED: ICD-10-CM

## 2024-03-10 RX ORDER — MONTELUKAST SODIUM 10 MG/1
10 TABLET ORAL
Qty: 90 TABLET | Refills: 1 | Status: SHIPPED | OUTPATIENT
Start: 2024-03-10

## 2024-03-11 DIAGNOSIS — Z12.31 SCREENING MAMMOGRAM, ENCOUNTER FOR: ICD-10-CM

## 2024-03-12 ENCOUNTER — OFFICE VISIT (OUTPATIENT)
Age: 59
End: 2024-03-12
Payer: COMMERCIAL

## 2024-03-12 DIAGNOSIS — M25.562 CHRONIC PAIN OF LEFT KNEE: Primary | ICD-10-CM

## 2024-03-12 DIAGNOSIS — G89.29 CHRONIC PAIN OF LEFT KNEE: Primary | ICD-10-CM

## 2024-03-12 PROCEDURE — 97112 NEUROMUSCULAR REEDUCATION: CPT | Performed by: PHYSICAL THERAPIST

## 2024-03-12 PROCEDURE — 97110 THERAPEUTIC EXERCISES: CPT | Performed by: PHYSICAL THERAPIST

## 2024-03-12 NOTE — PROGRESS NOTES
"Daily Note     Today's date: 3/12/2024  Patient name: Pam Montalvo  : 1965  MRN: [id number]  Referring provider: Crystal Hoyt*  Dx:   Encounter Diagnosis     ICD-10-CM    1. Chronic pain of left knee  M25.562     G89.29             Start Time: 1630  Stop Time: 1720  Total time in clinic (min): 50 minutes    Subjective: Pt reports felling pretty good today. Pain reported as 2/10 at start of session.         Objective: See treatment diary below        Assessment: pt demonstrates good initial response to POC. Load added to improve LE strength and activity tolerance where tolerated. Stretching for IT band added and shown for HEP. Pt would benefit from continued skilled PT to resolve remaining functional impairments and facilitate return to PLOF.       Plan: Continue per plan of care.  Progress treatment as tolerated.         Precautions: Hx of CA     Daily Treatment Diary:      Initial Evaluation Date: 24  Compliance 2/27  3/7  3/12                 Visit Number 1 2  3                 Re-Eval  IE                 MC   Foto Captured y                           2/27  3/7  3/12                 Manual                                                                                        Ther-Ex                      PROM                      ITB stretch     6x20\"                 4-way SLR X10 ea  2# 2x10 ea  3# 2x10 ea                 Prone TKE 10x5\" 2x10 5\" ea 2x10 5\" ea          Straight Leg bridge   Gurpreet 2x10 5\" ea  Gurpreet 2x10 5\" ea                 Std Hip ABD/Ext   2x10 ea (B)  3# 2x10 ea (B)                 Partial Sq 2x10  3x10  Bench c 1 pad  3x10                                                                                   Edu                      Neuro Re-Ed                      SLS Balance   10x10\"  10x10\"                 SLS A/P pivot             Lat Step Down 8in   x10 6in 3x10  6in 3x10                                     Ther-Act                                                          "      Modalities

## 2024-03-14 ENCOUNTER — OFFICE VISIT (OUTPATIENT)
Age: 59
End: 2024-03-14
Payer: COMMERCIAL

## 2024-03-14 DIAGNOSIS — G89.29 CHRONIC PAIN OF LEFT KNEE: Primary | ICD-10-CM

## 2024-03-14 DIAGNOSIS — M25.562 CHRONIC PAIN OF LEFT KNEE: Primary | ICD-10-CM

## 2024-03-14 PROCEDURE — 97110 THERAPEUTIC EXERCISES: CPT | Performed by: PHYSICAL THERAPIST

## 2024-03-14 PROCEDURE — 97112 NEUROMUSCULAR REEDUCATION: CPT | Performed by: PHYSICAL THERAPIST

## 2024-03-15 NOTE — PROGRESS NOTES
"Daily Note     Today's date: 3/15/2024  Patient name: Pam Montalvo  : 1965  MRN: [id number]  Referring provider: Crystal Hoyt*  Dx:   Encounter Diagnosis     ICD-10-CM    1. Chronic pain of left knee  M25.562     G89.29             Start Time: 1630  Stop Time: 1725  Total time in clinic (min): 55 minutes    Subjective: Pt reports pain during day improved. Pain still wakes her most nights. Pain reported as 2/10 at start of session.         Objective: See treatment diary below        Assessment: pt demonstrates good initial response to POC. Pt better able to control ecc knee motion on step down. Load added to improve LE strength and activity tolerance where tolerated. Pt would benefit from continued skilled PT to resolve remaining functional impairments and facilitate return to PLOF.       Plan: Continue per plan of care.  Progress treatment as tolerated.         Precautions: Hx of CA     Daily Treatment Diary:      Initial Evaluation Date: 24  Compliance 2/27  3/7  3/12  3/14               Visit Number 1 2  3  4               Re-Eval  IE                 MC   Foto Captured y                           2/27  3/7  3/12  3/14               Manual                                                                                        Ther-Ex                      Active warm-up    AROM  Bike 5min         PROM                      ITB stretch     6x20\"  6x20\"               4-way SLR X10 ea  2# 2x10 ea  3# 2x10 ea  3# 2x10 ea               Prone TKE 10x5\" 2x10 5\" ea 2x10 5\" ea 2x10 5\" ea         Sup bridge   Gurpreet 2x10 5\" ea  Gurpreet 2x10 5\" ea  Gurpreet 2x10 5\" ea               Std Hip ABD/Ext   2x10 ea (B)  3# 2x10 ea (B)  3# 2x10 ea (B)               Partial Sq 2x10  3x10  Bench c 1 pad  3x10  Bench c 1 pad  3x10                                                                                 Edu                      Neuro Re-Ed                      SLS Balance   10x10\"  10x10\"  10x10\"               SLS A/P " pivot             Lat Step Down 8in   x10 6in 3x10  6in 3x10  6in 3x10                                    Ther-Act                                                               Modalities

## 2024-03-19 ENCOUNTER — OFFICE VISIT (OUTPATIENT)
Age: 59
End: 2024-03-19
Payer: COMMERCIAL

## 2024-03-19 DIAGNOSIS — G89.29 CHRONIC PAIN OF LEFT KNEE: Primary | ICD-10-CM

## 2024-03-19 DIAGNOSIS — M25.562 CHRONIC PAIN OF LEFT KNEE: Primary | ICD-10-CM

## 2024-03-19 PROCEDURE — 97110 THERAPEUTIC EXERCISES: CPT | Performed by: PHYSICAL THERAPIST

## 2024-03-19 PROCEDURE — 97112 NEUROMUSCULAR REEDUCATION: CPT | Performed by: PHYSICAL THERAPIST

## 2024-03-19 NOTE — PROGRESS NOTES
"Daily Note     Today's date: 3/19/2024  Patient name: Pam Montalvo  : 1965  MRN: [id number]  Referring provider: Crystal Hoyt*  Dx:   Encounter Diagnosis     ICD-10-CM    1. Chronic pain of left knee  M25.562     G89.29             Start Time: 1630  Stop Time: 1725  Total time in clinic (min): 55 minutes    Subjective: Pt reports sleeping well night of last session. Woke each night since at least 1x. Pain reported as 2/10 at start of session.         Objective: See treatment diary below        Assessment: pt demonstrates good initial response to POC. Stretching into knee Ext perf to increase TKE during stance and terminal phases of gait. Pt better able to control ecc knee motion on step down. Pt would benefit from continued skilled PT to resolve remaining functional impairments and facilitate return to PLOF.       Plan: Continue per plan of care.  Progress treatment as tolerated.         Precautions: Hx of CA     Daily Treatment Diary:      Initial Evaluation Date: 24  Compliance 2/27  3/7  3/12  3/14  3/19             Visit Number 1 2  3  4  5             Re-Eval  IE                 MC   Foto Captured y                           2/27  3/7  3/12  3/14  3/19             Manual                                                                                        Ther-Ex                      Active warm-up    AROM  Bike 5min AROM  Bike 5min        PROM         5'             ITB stretch     6x20\"  6x20\"  6x20\"             4-way SLR X10 ea  2# 2x10 ea  3# 2x10 ea  3# 2x10 ea  3# 2x10 ea             Prone TKE 10x5\" 2x10 5\" ea 2x10 5\" ea 2x10 5\" ea 2x10 5\" ea        Sup bridge   Gurpreet 2x10 5\" ea  Gurpreet 2x10 5\" ea  Gurpreet 2x10 5\" ea  Gurpreet 2x10 5\" ea             Std Hip ABD/Ext   2x10 ea (B)  3# 2x10 ea (B)  3# 2x10 ea (B)  3# 2x10 ea (B)             Partial Sq 2x10  3x10  Bench c 1 pad  3x10  Bench c 1 pad  3x10  Bench c 1 pad  3x10                                                                         " "      Edu                      Neuro Re-Ed                      SLS Balance   10x10\"  10x10\"  10x10\"  10x10\"             SLS A/P pivot             Lat Step Down 8in   x10 6in 3x10  6in 3x10  6in 3x10  8in 3x10                                   Ther-Act                                                               Modalities                                                                              "

## 2024-03-21 ENCOUNTER — OFFICE VISIT (OUTPATIENT)
Age: 59
End: 2024-03-21
Payer: COMMERCIAL

## 2024-03-21 DIAGNOSIS — M25.562 CHRONIC PAIN OF LEFT KNEE: Primary | ICD-10-CM

## 2024-03-21 DIAGNOSIS — G89.29 CHRONIC PAIN OF LEFT KNEE: Primary | ICD-10-CM

## 2024-03-21 PROCEDURE — 97110 THERAPEUTIC EXERCISES: CPT | Performed by: PHYSICAL THERAPIST

## 2024-03-21 PROCEDURE — 97112 NEUROMUSCULAR REEDUCATION: CPT | Performed by: PHYSICAL THERAPIST

## 2024-03-21 NOTE — PROGRESS NOTES
"Daily Note     Today's date: 3/21/2024  Patient name: Pam Montalvo  : 1965  MRN: [id number]  Referring provider: Crystal Hoyt*  Dx:   Encounter Diagnosis     ICD-10-CM    1. Chronic pain of left knee  M25.562     G89.29             Start Time: 1630  Stop Time: 1725  Total time in clinic (min): 55 minutes    Subjective: Pt reports aching on patella following last session. Pain reported as 2/10 at start of session.         Objective: See treatment diary below        Assessment: pt demonstrates good initial response to POC. Stretching into knee Ext perf to increase TKE during stance and terminal phases of gait. Was able to achieve 5* knee hyper extension for first time today. Pt better able to control ecc knee motion on step down. Pt would benefit from continued skilled PT to resolve remaining functional impairments and facilitate return to PLOF.       Plan: Continue per plan of care.  Progress treatment as tolerated.         Precautions: Hx of CA     Daily Treatment Diary:      Initial Evaluation Date: 24  Compliance 2/27  3/7  3/12  3/14  3/19  3/21           Visit Number 1 2  3  4  5  6           Re-Eval  IE                 MC   Foto Captured y                           2/27  3/7  3/12  3/14  3/19  3/21           Manual                                                                                        Ther-Ex                      Active warm-up    AROM  Bike 5min AROM  Bike 5min AROM  Bike 5min       PROM         5'  5'           ITB stretch     6x20\"  6x20\"  6x20\"  6x20\"           4-way SLR X10 ea  2# 2x10 ea  3# 2x10 ea  3# 2x10 ea  3# 2x10 ea  3# 2x10 ea           Prone TKE 10x5\" 2x10 5\" ea 2x10 5\" ea 2x10 5\" ea 2x10 5\" ea 2x10 5\" ea       Sup bridge   Gurpreet 2x10 5\" ea  Gurpreet 2x10 5\" ea  Gurpreet 2x10 5\" ea  Gurpreet 2x10 5\" ea  Gurpreet 2x10 5\" ea           Std Hip ABD/Ext   2x10 ea (B)  3# 2x10 ea (B)  3# 2x10 ea (B)  3# 2x10 ea (B)  3# 2x10 ea (B)           Partial Sq 2x10  3x10  Bench c 1 " "pad  3x10  Bench c 1 pad  3x10  Bench c 1 pad  3x10  Bench c 1 pad  3x10                                                                             Edu                      Neuro Re-Ed                      SLS Balance   10x10\"  10x10\"  10x10\"  10x10\"  10x10\"           SLS A/P pivot             Lat Step Down 8in   x10 6in 3x10  6in 3x10  6in 3x10  8in 3x10  8in 3x10                                  Ther-Act                                                               Modalities                                                                              "

## 2024-03-26 ENCOUNTER — APPOINTMENT (OUTPATIENT)
Age: 59
End: 2024-03-26
Payer: COMMERCIAL

## 2024-03-28 ENCOUNTER — APPOINTMENT (OUTPATIENT)
Age: 59
End: 2024-03-28
Payer: COMMERCIAL

## 2024-04-03 ENCOUNTER — EVALUATION (OUTPATIENT)
Age: 59
End: 2024-04-03
Payer: COMMERCIAL

## 2024-04-03 DIAGNOSIS — G89.29 CHRONIC PAIN OF LEFT KNEE: Primary | ICD-10-CM

## 2024-04-03 DIAGNOSIS — M25.562 CHRONIC PAIN OF LEFT KNEE: Primary | ICD-10-CM

## 2024-04-03 PROCEDURE — 97110 THERAPEUTIC EXERCISES: CPT

## 2024-04-03 PROCEDURE — 97140 MANUAL THERAPY 1/> REGIONS: CPT

## 2024-04-03 PROCEDURE — 97112 NEUROMUSCULAR REEDUCATION: CPT

## 2024-04-03 PROCEDURE — 97164 PT RE-EVAL EST PLAN CARE: CPT

## 2024-04-03 NOTE — PROGRESS NOTES
Re-Evaluation     Today's date: 4/3/2024  Patient name: Pam Montalvo  : 1965  MRN: [id number]  Referring provider: Crystal Hoyt*  Dx:   Encounter Diagnosis     ICD-10-CM    1. Chronic pain of left knee  M25.562     G89.29           Start Time: 1622  Stop Time: 1728  Total time in clinic (min): 66 minutes    Subjective: Pt reports current that she can do most tasks but continues to have pain with activities.  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  Pain intensity at worst to 6-7/10.  States that she has not been as active recently due to being sick and notes slight worsening of symptoms since she has been more sedentary after an initial improvement of symptoms.  Continues to have pain and discomfort with inactivity - most notably at night.        Objective: See treatment diary below    Tenderness   Left Knee   Tenderness in the medial and lateral joint line.     Active Range of Motion   Left Knee   Flexion: 120 degrees   Extension: -6 degrees with pain    Passive Range of Motion   Left Knee   Flexion: 125 degrees   Extension: -3 degrees     Strength/Myotome Testing     Left Knee   Left knee flexion strength: 4+/5 MMT +pain  Left knee extension strength: 4+/5 MMT +pain    Right Knee   Right knee flexion strength: 5/5 MMT  Right knee extension strength: 5/5 MMT    FOTO = 75    Assessment: Continuing to work towards both short-term and long-term goals.  Pt had recent set-back due to being sick for the past 2 weeks.  Pt notes that pain has worsened back to baseline symptoms.  Continues to be lacking full L knee extension and remains limited into flexion with AROM.  ROM improvement with PROM indicating motor control dysfunction with AROM.  No change in FOTO score at this time due to recent set-back.  Stair negotiation is biggest limitation - unable to tolerate eccentric step downs from 8 inch step this tx session.   Tolerated treatment well. Patient would benefit from continued PT.  1:1 with Son Morales DPT entirety of tx.    Assessment details: Pam Montalvo presents to PT with chronic Hx of insidious onset L knee pain. Significant findings from examination include: mild extension deficit, TTP medial joint line, strong/painful contractile testing. These findings are consistent with referring Dx of medial knee DJD, limiting their functional mobility. Pt would benefit from course of PT to resolve the above mentioned impairments and facilitate return to PLOF.     Impairments: abnormal or restricted ROM, impaired physical strength, lacks appropriate home exercise program and pain with function    Symptom irritability: moderateUnderstanding of Dx/Px/POC: good   Prognosis: good    Goals  Short Term Functional Goals:   Patient to be independent with HEP to maximize improvement in functional mobility. - ONGOING  Decrease L Knee pain to 3 on a scale of 0-10 to allow dressing and improved stair climbing. - ONGOING  pt will increase FOTO score by 10pts to reflect a statistically significant improvement. - ONGOING       Long Term Functional Goals (Goals for patient at discharge):     Improve functional mobility: Patient will stand for 90 minutes and walk 2 blocks with less than 3/10 pain. - ONGOING  Patient will ascend/descend 2 flight of stairs with less than 2/10 pain. - ONGOING  pt will score at or above predicted discharge FOTO score of 80 to reflect improvement in subjective functional ability. - ONGOING    Plan: Continue per plan of care.     Patient would benefit from: skilled physical therapy  Referral necessary: No  Planned therapy interventions: manual therapy, neuromuscular re-education, patient education, therapeutic activities, therapeutic exercise and home exercise program  Frequency: 2x week  Duration in weeks: 6  Plan of Care beginning date: 4/3/2024  Plan of Care expiration date: 5/15/2024  Treatment plan discussed  "with: patient     Precautions: Hx of CA     Daily Treatment Diary:      Initial Evaluation Date: 02/27/24  Compliance 2/27  3/7  3/12  3/14  3/19  3/21  4/3         Visit Number 1 2  3  4  5  6  7         Re-Eval  IE           RE      MC   Foto Captured y           y                2/27  3/7  3/12  3/14  3/19  3/21  4/3         Manual                      L knee PROM + stretching             MM 8'                                                     Ther-Ex                                   Active warm-up    AROM  Bike 5min AROM  Bike 5min AROM  Bike 5min Rec bike  8' L2      PROM         5'  5'           ITB stretch     6x20\"  6x20\"  6x20\"  6x20\"           4-way SLR X10 ea  2# 2x10 ea  3# 2x10 ea  3# 2x10 ea  3# 2x10 ea  3# 2x10 ea  2x10 ea 3#         Sup bridge   Gurpreet 2x10 5\" ea  Gurpreet 2x10 5\" ea  Gurpreet 2x10 5\" ea  Gurpreet 2x10 5\" ea  Gurpreet 2x10 5\" ea  2x10 on PB         Std Hip ABD/Ext   2x10 ea (B)  3# 2x10 ea (B)  3# 2x10 ea (B)  3# 2x10 ea (B)  3# 2x10 ea (B)           Partial Sq 2x10  3x10  Bench c 1 pad  3x10  Bench c 1 pad  3x10  Bench c 1 pad  3x10  Bench c 1 pad  3x10  Bench  3x10 + hip abd iso btb         90/90 HS S             10x10\"         LAQ            2x10 5#                                Edu                      Neuro Re-Ed                      SLS Balance   10x10\"  10x10\"  10x10\"  10x10\"  10x10\"  10x max blue pad         SLS A/P pivot             Lat Step Down 8in   x10 6in 3x10  6in 3x10  6in 3x10  8in 3x10  8in 3x10  Unable 8\"      L FSU       20x 8\"      L LSU       20x 8\"      L X-LSU       20x 6\"      Prone TKE 10x5\" 2x10 5\" ea 2x10 5\" ea 2x10 5\" ea 2x10 5\" ea 2x10 5\" ea 2x10 5\" ea + 5# aw                   Ther-Act                                                               Modalities                                                                                "

## 2024-04-08 ENCOUNTER — OFFICE VISIT (OUTPATIENT)
Age: 59
End: 2024-04-08
Payer: COMMERCIAL

## 2024-04-08 DIAGNOSIS — G89.29 CHRONIC PAIN OF LEFT KNEE: Primary | ICD-10-CM

## 2024-04-08 DIAGNOSIS — M25.562 CHRONIC PAIN OF LEFT KNEE: Primary | ICD-10-CM

## 2024-04-08 PROCEDURE — 97110 THERAPEUTIC EXERCISES: CPT

## 2024-04-08 PROCEDURE — 97112 NEUROMUSCULAR REEDUCATION: CPT

## 2024-04-08 PROCEDURE — 97530 THERAPEUTIC ACTIVITIES: CPT

## 2024-04-08 PROCEDURE — 97140 MANUAL THERAPY 1/> REGIONS: CPT

## 2024-04-08 NOTE — PROGRESS NOTES
"Daily Note     Today's date: 2024  Patient name: Pam Montalvo  : 1965  MRN: [id number]  Referring provider: Crystal Hoyt*  Dx:   Encounter Diagnosis     ICD-10-CM    1. Chronic pain of left knee  M25.562     G89.29           Start Time: 1620  Stop Time: 1736  Total time in clinic (min): 76 minutes    Subjective: Pt reports continued pain at night.  Mild pain throughout the day but pain is always worsened when sleeping - wakes her up.      Objective: See treatment diary below      Assessment: Tolerated treatment well. Patient would benefit from continued PT.  Pt able to tolerate continued variation and progression of planned therapeutic interventions this visit.  Utilized mobility and stretching techniques to promote ROM improvement and decrease onset of tightness discomfort/pain at night.  Pt with mild fatigue noted post-session.  No aggravation of pain symptoms during tx session.  Dynamic and static balance deficit persists - requires intermittent UE assistance on the parallel bar to complete upright/functional training exercises.  1:1 with Son Morales DPT entirety of tx.        Plan: Continue per plan of care.      Precautions: Hx of CA     Daily Treatment Diary:      Initial Evaluation Date: 24  Compliance 2/27  3/7  3/12  3/14  3/19  3/21  4/3  4/8   Visit Number 1 2  3  4  5  6  7  8   Re-Eval  IE           RE     Foto Captured y           y            2/27  3/7  3/12  3/14  3/19  3/21  4/3  4/8       Manual                      L knee PROM + stretching             MM 8'  MM 8'                                                   Ther-Ex                                   Active warm-up    AROM  Bike 5min AROM  Bike 5min AROM  Bike 5min Rec bike  8' L2 Rec bike  8' L2     PROM         5'  5'           ITB stretch     6x20\"  6x20\"  6x20\" 6x20\"           4-way SLR X10 ea 2# 2x10 ea 3# 2x10 ea 3# 2x10 ea 3# 2x10 ea 3# 2x10 ea 2x10 ea 3# 2x10 ea 3#       Std Hip ABD/Ext  2x10 ea (B) 3# " "2x10 ea (B) 3# 2x10 ea (B) 3# 2x10 ea (B) 3# 2x10 ea (B)           Partial Sq 2x10 3x10              90/90 active HS S            10x10\" 10x10\"       LAQ            2x10 5#  2x10 5#       Prone quad S              10x15\"       HEP review              5'       Neuro Re-Ed                     SLS Balance  10x10\" 10x10\" 10x10\" 10x10\" 10x10\" 10x max blue pad        SLS A/P pivot             Lat Step Down 8in   x10 6in 3x10  6in 3x10  6in 3x10  8in 3x10  8in 3x10  Unable 8\"      Heel slides + strap O-P        10x10\"     Sup bridge  Gurpreet 2x10 5\" ea Gurpreet 2x10 5\" ea Gurpreet 2x10 5\" ea Gurpreet 2x10 5\" ea Gurpreet 2x10 5\" ea 2x10 on PB 2x10 on PB                  Prone TKE 10x5\" 2x10 5\" ea 2x10 5\" ea 2x10 5\" ea 2x10 5\" ea 2x10 5\" ea 2x10 5\" ea + 5# aw Stand TB  2x10 mtb                  Ther-Act             STS   Bench c 1 pad  3x10 Bench c 1 pad  3x10 Bench c 1 pad  3x10 Bench c 1 pad  3x10 Bench  3x10 + hip abd iso btb Bench  2x10 + hip abd iso btb     L FSU            20x 8\" Runner's step ups 15x alt LE       L LSU       20x 8\"      L X-LSU       20x 6\"       Modalities                                                                                  "

## 2024-04-10 ENCOUNTER — OFFICE VISIT (OUTPATIENT)
Age: 59
End: 2024-04-10
Payer: COMMERCIAL

## 2024-04-10 DIAGNOSIS — M25.562 CHRONIC PAIN OF LEFT KNEE: Primary | ICD-10-CM

## 2024-04-10 DIAGNOSIS — G89.29 CHRONIC PAIN OF LEFT KNEE: Primary | ICD-10-CM

## 2024-04-10 PROCEDURE — 97110 THERAPEUTIC EXERCISES: CPT

## 2024-04-10 PROCEDURE — 97112 NEUROMUSCULAR REEDUCATION: CPT

## 2024-04-10 PROCEDURE — 97530 THERAPEUTIC ACTIVITIES: CPT

## 2024-04-10 NOTE — PROGRESS NOTES
Daily Note     Today's date: 4/10/2024  Patient name: Pam Montalvo  : 1965  MRN: [id number]  Referring provider: Crystal Hoyt*  Dx:   Encounter Diagnosis     ICD-10-CM    1. Chronic pain of left knee  M25.562     G89.29           Start Time: 1630  Stop Time: 1750  Total time in clinic (min): 80 minutes    Subjective: Pt notes attempting to perform stretches prior to going to bed.  Continues to wake up at night in pain but pain intensity is not as severe.  Pt reports mild numbness sensation at night radiating through LLE.        Objective: See treatment diary below      Assessment: Tolerated treatment well. Patient would benefit from continued PT.  Pt able to tolerate continued and gradual progression of planned therapeutic interventions.  Mild knee pain aggravation when performing full knee flexion with overpressure - utilized quadriceps in sidelying with towel to avoid full knee flexion when performing.  Held prone quad stretching as pt was unable to complete at home - utilized sidelying as a variation that she can perform prior to bed.  Close supervision provided with SLS variation this visit - balance deficit persists.  Trialed repeated lumbar flexion with PB 3 way seated to assess any radicular changes - pt with good tolerance of repeated flexion with slight relief of symptoms.  1:1 with Son Morales DPT entirety of tx.        Plan: Continue per plan of care.      Precautions: Hx of CA     Daily Treatment Diary:      Initial Evaluation Date: 24  Compliance 2/27  3/7  3/12  3/14  3/19  3/21  4/3  4/8 4/10   Visit Number 1 2  3  4  5  6  7  8 9   Re-Eval  IE           RE      Foto Captured y           y             2/27  3/7  3/12  3/14  3/19  3/21  4/3  4/8  4/10     Manual                      L knee PROM + stretching             MM 8'  MM 8'                                                   Ther-Ex                                   Active warm-up    AROM  Bike 5min AROM  Bike 5min  "AROM  Bike 5min Rec bike  8' L2 Rec bike  8' L2 Rec bike  8' L2    PROM         5'  5'           ITB stretch     6x20\"  6x20\"  6x20\" 6x20\"           4-way SLR X10 ea 2# 2x10 ea 3# 2x10 ea 3# 2x10 ea 3# 2x10 ea 3# 2x10 ea 2x10 ea 3# 2x10 ea 3#       Std Hip ABD/Ext  2x10 ea (B) 3# 2x10 ea (B) 3# 2x10 ea (B) 3# 2x10 ea (B) 3# 2x10 ea (B)           Partial Sq 2x10 3x10              90/90 active HS S            10x10\" 10x10\"  10x10\"     LAQ            2x10 5#  2x10 5#  2x10 5#     Prone quad S              10x15\"  S/L 10x10\"     L SL leg press         10x 35#  2x10 45#    DL leg press         10x 95#  2x10 105#    HEP review              5'       Neuro Re-Ed                     SLS Balance  10x10\" 10x10\" 10x10\" 10x10\" 10x10\" 10x max blue pad  3 way floor clock 15x ea B (3# aw on L ankle)     Lat Step Down 8in   x10 6in 3x10  6in 3x10  6in 3x10  8in 3x10  8in 3x10  Unable 8\"      Heel slides + strap O-P        10x10\" 10x10\"    Bridges  Gurpreet 2x10 5\" ea Gurrpeet 2x10 5\" ea Gurpreet 2x10 5\" ea Gurpreet 2x10 5\" ea Gurpreet 2x10 5\" ea 2x10 on PB 2x10 on PB 3x10 on PB + 5#    Bosu fwd lunges         20x alt LE    Prone TKE 10x5\" 2x10 5\" ea 2x10 5\" ea 2x10 5\" ea 2x10 5\" ea 2x10 5\" ea 2x10 5\" ea + 5# aw Stand TB  2x10 mtb     Seated PB rollout 3 way         10x ea    Ther-Act             STS   Bench c 1 pad  3x10 Bench c 1 pad  3x10 Bench c 1 pad  3x10 Bench c 1 pad  3x10 Bench  3x10 + hip abd iso btb Bench  2x10 + hip abd iso btb Bench  2x10 5# + hip abd iso btb    L FSU            20x 8\" Runner's step ups 15x alt LE 8\" step Runner's step ups 20x alt LE 8\" step     L LSU       20x 8\"      L X-LSU       20x 6\"       Modalities                                                                                    "

## 2024-04-12 DIAGNOSIS — I10 ESSENTIAL (PRIMARY) HYPERTENSION: ICD-10-CM

## 2024-04-12 RX ORDER — AMLODIPINE BESYLATE 5 MG/1
5 TABLET ORAL DAILY
Qty: 90 TABLET | Refills: 0 | Status: SHIPPED | OUTPATIENT
Start: 2024-04-12

## 2024-04-15 ENCOUNTER — OFFICE VISIT (OUTPATIENT)
Age: 59
End: 2024-04-15
Payer: COMMERCIAL

## 2024-04-15 ENCOUNTER — APPOINTMENT (OUTPATIENT)
Age: 59
End: 2024-04-15
Payer: COMMERCIAL

## 2024-04-15 DIAGNOSIS — G89.29 CHRONIC PAIN OF LEFT KNEE: Primary | ICD-10-CM

## 2024-04-15 DIAGNOSIS — M25.562 CHRONIC PAIN OF LEFT KNEE: Primary | ICD-10-CM

## 2024-04-15 PROCEDURE — 97112 NEUROMUSCULAR REEDUCATION: CPT

## 2024-04-15 PROCEDURE — 97530 THERAPEUTIC ACTIVITIES: CPT

## 2024-04-15 PROCEDURE — 97110 THERAPEUTIC EXERCISES: CPT

## 2024-04-15 NOTE — PROGRESS NOTES
"Daily Note     Today's date: 4/15/2024  Patient name: Pam Montalvo  : 1965  MRN: [id number]  Referring provider: Crystal Hoyt*  Dx:   Encounter Diagnosis     ICD-10-CM    1. Chronic pain of left knee  M25.562     G89.29           Start Time: 1627  Stop Time: 1730  Total time in clinic (min): 63 minutes    Subjective: Pt reports continued L knee pain but was much less severe yesterday compared to at the start of the weekend.        Objective: See treatment diary below      Assessment: Tolerated treatment well. Patient would benefit from continued PT.  Pt able to tolerate continued and slight progression of planned therapeutic interventions this visit.  Gradual motor control improvement.  Continues to demonstrate balance deficit - UE assistance on the parallel bar to perform various balance tasks such as open gate and runners step ups.  Close supervision provided when completing static and dynamic balance interventions.  Incorporating light lumbar targeted interventions due to chronic back pain.  Mild fatigue noted post-session.  L knee pain persists but not aggravated with targeted exercises this visit.  1:1 with Son Morales DPT entirety of tx.        Plan: Continue per plan of care.      Precautions: Hx of CA     Daily Treatment Diary:      Initial Evaluation Date: 24  Compliance 2/27  3/7  3/12  3/14  3/19  3/21  4/3  4/8 4/10 4/15   Visit Number 1 2  3  4  5  6  7  8 9 10   Re-Eval  IE           RE       Foto Captured y           y               3/12  3/14  3/19  3/21  43  4/8  4/10  4/15   Manual                   L knee PROM + stretching          MM 8'  MM 8'                                             Ther-Ex                              Active warm-up  AROM  Bike 5min AROM  Bike 5min AROM  Bike 5min Rec bike  8' L2 Rec bike  8' L2 Rec bike  8' L2 Rec bike  8' L3   PROM      5'  5'           ITB stretch  6x20\"  6x20\"  6x20\" 6x20\"           4-way SLR 3# 2x10 ea 3# 2x10 ea 3# 2x10 ea " "3# 2x10 ea 2x10 ea 3# 2x10 ea 3#       Std Hip ABD/Ext 3# 2x10 ea (B) 3# 2x10 ea (B) 3# 2x10 ea (B) 3# 2x10 ea (B)           Partial Sq              90/90 active HS S         10x10\" 10x10\"  10x10\"     LAQ         2x10 5#  2x10 5#  2x10 5#     Prone quad S           10x15\"  S/L 10x10\"  Iván test + strap 10x10\"  S/L 10x10\"   L SL leg press       10x 35#  2x10 45# 10x 45#  2x10 55#   DL leg press       10x 95#  2x10 105# 10x 105#  2x10 115#   Deadlift / rack pull        3x5 10# from 8\" step   HEP review           5'       Neuro Re-Ed                  SLS Balance 10x10\" 10x10\" 10x10\" 10x10\" 10x max blue pad  3 way floor clock 15x ea B (3# aw on L ankle)  + hip open gate 10x B   Lat Step Down 6in 3x10  6in 3x10  8in 3x10  8in 3x10  Unable 8\"      Heel slides + strap O-P      10x10\" 10x10\"    Bridges Gurpreet 2x10 5\" ea Gurpreet 2x10 5\" ea Gurpreet 2x10 5\" ea Gurpreet 2x10 5\" ea 2x10 on PB 2x10 on PB 3x10 on PB + 5#    Bosu fwd lunges       20x alt LE 15x alt LE   Bosu split squats        2x8 B   Prone TKE 2x10 5\" ea 2x10 5\" ea 2x10 5\" ea 2x10 5\" ea 2x10 5\" ea + 5# aw Stand TB  2x10 mtb     Seated PB rollout 3 way       10x ea 10x ea   Ther-Act           Sidestepping        4 laps mirror otb   STS Bench c 1 pad  3x10 Bench c 1 pad  3x10 Bench c 1 pad  3x10 Bench c 1 pad  3x10 Bench  3x10 + hip abd iso btb Bench  2x10 + hip abd iso btb Bench  2x10 5# + hip abd iso btb    L FSU         20x 8\" Runner's step ups 15x alt LE 8\" step Runner's step ups 20x alt LE 8\" step Runner's step ups 20x alt LE 8\" step 3# aw's   L LSU     20x 8\"      L X-LSU     20x 6\"       Modalities                                                                            "

## 2024-04-16 ENCOUNTER — OFFICE VISIT (OUTPATIENT)
Age: 59
End: 2024-04-16

## 2024-04-16 DIAGNOSIS — M94.0 COSTOCHONDRITIS: ICD-10-CM

## 2024-04-16 DIAGNOSIS — G89.29 CHRONIC PAIN OF LEFT KNEE: Primary | ICD-10-CM

## 2024-04-16 DIAGNOSIS — M25.562 CHRONIC PAIN OF LEFT KNEE: Primary | ICD-10-CM

## 2024-04-16 DIAGNOSIS — E66.9 OBESITY (BMI 30.0-34.9): ICD-10-CM

## 2024-04-16 PROCEDURE — MEDROL 4MG MEDROL 4MG: Performed by: STUDENT IN AN ORGANIZED HEALTH CARE EDUCATION/TRAINING PROGRAM

## 2024-04-16 PROCEDURE — 99214 OFFICE O/P EST MOD 30 MIN: CPT | Performed by: NURSE PRACTITIONER

## 2024-04-16 RX ORDER — METHYLPREDNISOLONE 4 MG/1
TABLET ORAL
Start: 2024-04-16

## 2024-04-16 NOTE — PROGRESS NOTES
"Name: Pam Montalvo      : 1965      MRN: [id number]  Encounter Provider: [facility] Bates  Encounter Date: 2024   Encounter department: [facility] IN PARTNERSHIP WITH [facility]    Assessment & Plan     1. Chronic pain of left knee  Assessment & Plan:  Pt has completed an x-ray and has consistently been going to PT. She does not when she was sick she missed a few sessions of PT and her progress declined. Given length of time will order MRI and refer to ortho today.     Orders:  -     Ambulatory Referral to Orthopedic Surgery; Future  -     MRI knee left  wo contrast; Future; Expected date: 2024    2. Costochondritis  Assessment & Plan:  Muscle pain in chest following heavy coughing related to viral illness. Viral illness and coughing resolved, but pt reports TTP and soreness in chest area. Will give medrol dose pack at this time to decrease inflammation. Advised on SE and use. Pt verbalize understanding.   Discussed red flag warnings of chest pain which require more emergent f/u.     Orders:  -     methylPREDNISolone 4 MG tablet therapy pack; Use as directed on package    3. Obesity (BMI 30.0-34.9)           Subjective      Pt here today to f/u on left knee pain. Patient states her knee was improving with physical therapy, however she missed a few apts and her improvement declined. Pt notes pain is worse when she is inactive, and best when she is moving her knee. She does state the knee occasionally \"gives out\" when she is walking. She reports pain is a medial dull pain when walking, but a dull pain medially and laterally when resting. She denies injury to the area.     Pt also reporting tenderness in chest which occurred after recent viral illness and increased coughing. Pt notes area is TTP and with movement of her chest. She notes pain has been present consistently for 2 weeks, but is gradually improving. She denies radiation of pain, SOB. " "    Knee Pain   The incident occurred more than 1 week ago. There was no injury mechanism. The pain is present in the left knee. The pain is moderate. The pain has been Constant since onset. Pertinent negatives include no inability to bear weight, loss of motion, loss of sensation, muscle weakness, numbness or tingling. She reports no foreign bodies present. Exacerbated by: resting knee.     Review of Systems   Constitutional: Negative.  Negative for chills, diaphoresis and fever.   HENT: Negative.     Respiratory: Negative.  Negative for chest tightness and shortness of breath.    Cardiovascular:  Negative for chest pain and palpitations.   Gastrointestinal: Negative.    Genitourinary: Negative.    Musculoskeletal:  Negative for gait problem and joint swelling.        Left knee pain   Neurological:  Negative for tingling and numbness.       Current Outpatient Medications on File Prior to Visit   Medication Sig    albuterol (PROVENTIL HFA,VENTOLIN HFA) 90 mcg/act inhaler Inhale 2 puffs every 6 (six) hours as needed for wheezing or shortness of breath    amLODIPine (NORVASC) 5 mg tablet TAKE 1 TABLET (5 MG TOTAL) BY MOUTH DAILY.    levothyroxine (Synthroid) 25 mcg tablet Take 1 tablet (25 mcg total) by mouth daily    montelukast (SINGULAIR) 10 mg tablet TAKE 1 TABLET BY MOUTH DAILY AT BEDTIME       Objective     /92 (BP Location: Right arm, Patient Position: Sitting, Cuff Size: Large)   Pulse 66   Ht 5' 6\" (1.676 m)   Wt 95.7 kg (211 lb)   SpO2 99%   BMI 34.06 kg/m²     Physical Exam  Vitals and nursing note reviewed.   Constitutional:       General: She is not in acute distress.     Appearance: Normal appearance.   HENT:      Head: Normocephalic and atraumatic.      Right Ear: External ear normal.      Left Ear: External ear normal.      Nose: Nose normal.   Eyes:      General:         Right eye: No discharge.         Left eye: No discharge.      Conjunctiva/sclera: Conjunctivae normal.   Cardiovascular: "      Rate and Rhythm: Normal rate and regular rhythm.      Heart sounds: Normal heart sounds. No murmur heard.  Pulmonary:      Effort: Pulmonary effort is normal.      Breath sounds: Normal breath sounds.   Abdominal:      Palpations: Abdomen is soft.   Musculoskeletal:         General: Normal range of motion.        Arms:       Cervical back: Normal range of motion.      Right knee: Normal.      Left knee: No swelling, deformity, effusion, erythema, ecchymosis, lacerations, bony tenderness or crepitus. Normal range of motion. No tenderness. No LCL laxity, MCL laxity, ACL laxity or PCL laxity.Normal alignment, normal meniscus and normal patellar mobility. Normal pulse.      Right lower leg: No edema.      Left lower leg: No swelling. No edema.      Comments: Mild TTP   Lymphadenopathy:      Cervical: No cervical adenopathy.   Skin:     General: Skin is warm and dry.   Neurological:      Mental Status: She is alert and oriented to person, place, and time.   Psychiatric:         Mood and Affect: Mood normal.         Behavior: Behavior normal.         Thought Content: Thought content normal.         Judgment: Judgment normal.       [facility] Bates

## 2024-04-16 NOTE — PATIENT INSTRUCTIONS
Costochondritis   WHAT YOU NEED TO KNOW:   Costochondritis is a condition that causes pain in the cartilage that connect your ribs to your sternum (breastbone). Cartilage is the tough, bendable tissue that protects your bones.   DISCHARGE INSTRUCTIONS:   Medicines:   Acetaminophen:  This medicine decreases pain. Acetaminophen is available without a doctor's order. Ask how much to take and how often to take it. Follow directions. Acetaminophen can cause liver damage if not taken correctly.    NSAIDs , such as ibuprofen, help decrease swelling, pain, and fever. This medicine is available with or without a doctor's order. NSAIDs can cause stomach bleeding or kidney problems in certain people. If you take blood thinner medicine, always ask if NSAIDs are safe for you. Always read the medicine label and follow directions. Do not give these medicines to children younger than 6 months without direction from a healthcare provider.     Take your medicine as directed.  Contact your healthcare provider if you think your medicine is not helping or if you have side effects. Tell your provider if you are allergic to any medicine. Keep a list of the medicines, vitamins, and herbs you take. Include the amounts, and when and why you take them. Bring the list or the pill bottles to follow-up visits. Carry your medicine list with you in case of an emergency.    Follow up with your doctor as directed:  Write down your questions so you remember to ask them during your visits.  Rest:  You may need to get more rest. Learn which movements and activities cause pain, and avoid doing them. Do not carry objects, such as a purse or backpack, if this is painful. Avoid activities such as rowing and weightlifting until your pain decreases or goes away. Ask which activities are best for you to do while you recover.  Heat:  Heat helps decrease pain in some patients. Apply heat on the area for 20 to 30 minutes every 2 hours for as many days as  directed.   Ice:  Ice helps decrease swelling and pain. Ice may also help prevent tissue damage. Use an ice pack, or put crushed ice in a plastic bag. Cover it with a towel and place it on the painful area for 15 to 20 minutes every hour or as directed.  Stretching exercises:  Gentle stretching may help your symptoms.  a doorway and put your hands on the door frame at the level of your ears or shoulders. Take 1 step forward and gently stretch your chest. Try this with your hands higher up on the doorway.   Contact your healthcare provider if:   You have a fever.    The painful areas of your chest look swollen, red, and feel warm to the touch.     You cannot sleep because of the pain.    You have questions or concerns about your condition or care.    © Copyright [employer] 2023 Information is for End User's use only and may not be sold, redistributed or otherwise used for commercial purposes.  The above information is an  only. It is not intended as medical advice for individual conditions or treatments. Talk to your doctor, nurse or pharmacist before following any medical regimen to see if it is safe and effective for you.

## 2024-04-17 ENCOUNTER — OFFICE VISIT (OUTPATIENT)
Age: 59
End: 2024-04-17
Payer: COMMERCIAL

## 2024-04-17 VITALS
BODY MASS INDEX: 33.91 KG/M2 | HEART RATE: 66 BPM | SYSTOLIC BLOOD PRESSURE: 150 MMHG | DIASTOLIC BLOOD PRESSURE: 92 MMHG | HEIGHT: 66 IN | OXYGEN SATURATION: 99 % | WEIGHT: 211 LBS

## 2024-04-17 DIAGNOSIS — G89.29 CHRONIC PAIN OF LEFT KNEE: Primary | ICD-10-CM

## 2024-04-17 DIAGNOSIS — M25.562 CHRONIC PAIN OF LEFT KNEE: Primary | ICD-10-CM

## 2024-04-17 PROBLEM — M94.0 COSTOCHONDRITIS: Status: ACTIVE | Noted: 2024-04-17

## 2024-04-17 PROCEDURE — 97530 THERAPEUTIC ACTIVITIES: CPT

## 2024-04-17 PROCEDURE — 97112 NEUROMUSCULAR REEDUCATION: CPT

## 2024-04-17 PROCEDURE — 97110 THERAPEUTIC EXERCISES: CPT

## 2024-04-17 NOTE — ASSESSMENT & PLAN NOTE
Muscle pain in chest following heavy coughing related to viral illness. Viral illness and coughing resolved, but pt reports TTP and soreness in chest area. Will give medrol dose pack at this time to decrease inflammation. Advised on SE and use. Pt verbalize understanding.   Discussed red flag warnings of chest pain which require more emergent f/u.

## 2024-04-17 NOTE — ASSESSMENT & PLAN NOTE
Pt has completed an x-ray and has consistently been going to PT. She does not when she was sick she missed a few sessions of PT and her progress declined. Given length of time will order MRI and refer to ortho today.

## 2024-04-17 NOTE — PROGRESS NOTES
Daily Note     Today's date: 2024  Patient name: Pam Montalvo  : 1965  MRN: [id number]  Referring provider: Crystal Hoyt*  Dx:   Encounter Diagnosis     ICD-10-CM    1. Chronic pain of left knee  M25.562     G89.29           Start Time: 1625  Stop Time: 1730  Total time in clinic (min): 65 minutes    Subjective: Pt reports no significant changes in L knee status since last visit; although, she notes slightly decreased pain at night over the past several days.  Pain is significantly worse at night and wakes her up.  Pt notes that pain is better during the day when she is active.        Objective: See treatment diary below      Assessment: Tolerated treatment well. Patient would benefit from continued PT.  Pt functionality is gradually improving.  Pt continues to demonstrate balance deficit - UE assistance on the parallel bar to prevent LOB.  Mild buckling of L knee initially when performing TKE but pt stated it felt more stable with repetition.  Significant difficulty performing eccentric step downs due to quadriceps weakness - utilized lower step height to be able to perform.  Further discussed stretches and isometrics in order to attempt to alleviate symptoms.  1:1 with Son oMrales DPT entirety of tx.        Plan: Continue per plan of care.  Progress treatment as tolerated.       Precautions: Hx of CA     Daily Treatment Diary:      Initial Evaluation Date: 24  Compliance  3/12  3/14  3/19  3/21  4/3  4/8 4/10 4/15 4/17   Visit Number  3  4  5  6  7  8 9 10 11   Re-Eval           RE        Foto Captured          y                3/14  3/19  3/21  4/3  4/8  4/10  4/15 4/17   Manual                  L knee PROM + stretching        MM 8'  MM 8'                                            Ther-Ex                             Active warm-up AROM  Bike 5min AROM  Bike 5min AROM  Bike 5min Rec bike  8' L2 Rec bike  8' L2 Rec bike  8' L2 Rec bike  8' L3 Rec bike  8' L3   PROM    5'  5'         "    ITB stretch   6x20\"  6x20\" 6x20\"            4-way SLR 3# 2x10 ea 3# 2x10 ea 3# 2x10 ea 2x10 ea 3# 2x10 ea 3#        Std Hip ABD/Ext 3# 2x10 ea (B) 3# 2x10 ea (B) 3# 2x10 ea (B)            Partial Sq              90/90 active HS S       10x10\" 10x10\"  10x10\"      LAQ       2x10 5#  2x10 5#  2x10 5#      Prone quad S         10x15\"  S/L 10x10\"  Iván test + strap 10x10\"  S/L 10x10\"    L SL leg press      10x 35#  2x10 45# 10x 45#  2x10 55# 2x10 55#  10x 65#   DL leg press      10x 95#  2x10 105# 10x 105#  2x10 115# 2x10 115#  10x 125#   Deadlift / rack pull       3x5 10# from 8\" step 3x8 10# from 6\" step   HEP review         5'     10' add isometrics at bedtime   Neuro Re-Ed                 SLS Balance 10x10\" 10x10\" 10x10\" 10x max blue pad  3 way floor clock 15x ea B (3# aw on L ankle) + hip open gate 10x B + hip open gate 10x B   Lat Step Down 6in 3x10  8in 3x10  8in 3x10  Unable 8\"    2x10 6\"  Unable 8\"   Heel slides + strap O-P     10x10\" 10x10\"     Bridges Gurpreet 2x10 5\" ea Gurpreet 2x10 5\" ea Gurpreet 2x10 5\" ea 2x10 on PB 2x10 on PB 3x10 on PB + 5#     Bosu fwd lunges      20x alt LE 15x alt LE 15x alt LE   Bosu split squats       2x8 B 2x8 B   Prone TKE 2x10 5\" ea 2x10 5\" ea 2x10 5\" ea 2x10 5\" ea + 5# aw Stand TB  2x10 mtb   Stand loan  3x10 4#   PB wall squats        2x10   Seated PB rollout 3 way      10x ea 10x ea    Ther-Act           Sidestepping       4 laps mirror otb 4 laps mirror otb   STS Bench c 1 pad  3x10 Bench c 1 pad  3x10 Bench c 1 pad  3x10 Bench  3x10 + hip abd iso btb Bench  2x10 + hip abd iso btb Bench  2x10 5# + hip abd iso btb  Bench  2x10 10#   L FSU       20x 8\" Runner's step ups 15x alt LE 8\" step Runner's step ups 20x alt LE 8\" step Runner's step ups 20x alt LE 8\" step 3# aw's    L LSU    20x 8\"       L X-LSU    20x 6\"        Modalities                                                                            "

## 2024-04-22 ENCOUNTER — OFFICE VISIT (OUTPATIENT)
Age: 59
End: 2024-04-22
Payer: COMMERCIAL

## 2024-04-22 DIAGNOSIS — M25.562 CHRONIC PAIN OF LEFT KNEE: Primary | ICD-10-CM

## 2024-04-22 DIAGNOSIS — G89.29 CHRONIC PAIN OF LEFT KNEE: Primary | ICD-10-CM

## 2024-04-22 PROCEDURE — 97112 NEUROMUSCULAR REEDUCATION: CPT

## 2024-04-22 PROCEDURE — 97530 THERAPEUTIC ACTIVITIES: CPT

## 2024-04-22 PROCEDURE — 97110 THERAPEUTIC EXERCISES: CPT

## 2024-04-22 NOTE — PROGRESS NOTES
"Daily Note     Today's date: 2024  Patient name: Pam Montalvo  : 1965  MRN: [id number]  Referring provider: Crystal Hoyt*  Dx:   Encounter Diagnosis     ICD-10-CM    1. Chronic pain of left knee  M25.562     G89.29           Start Time: 1630  Stop Time: 1715  Total time in clinic (min): 45 minutes    Subjective: Pt was prescribed steroid pack recently which has temporarily \"masked pain\".  From recent visit with physician:  \"Costochondritis - Muscle pain in chest following heavy coughing related to viral illness. Viral illness and coughing resolved, but pt reports TTP and soreness in chest area. Will give medrol dose pack at this time to decrease inflammation.\" States that she continues to feel L knee buckling and stiffness though.  Improvement of sleep - able to sleep 4-6 hours this weekend without pain worsening.      Objective: See treatment diary below      Assessment: Tolerated treatment well. Patient would benefit from continued PT.  Pt able to tolerate continued and gradual variation/progression of planned therapeutic interventions this visit.  Balance deficit persists - utilizes parallel bar to maintain status.  Mild buckling noted on one occasion during visit.  Resisted knee extension in weight bearing positions remains more difficult task - discussed use of quadriceps with preventing buckling during gait.  1:1 with Son Morales DPT entirety of tx.        Plan: Continue per plan of care.      Precautions: Hx of CA     Daily Treatment Diary:      Initial Evaluation Date: 24  Compliance  3/19  3/21  4/3  4/8 4/10 4/15 4/17 4/22   Visit Number  5  6  7  8 9 10 11 12   Re-Eval       RE         Foto Captured      y                 3/19  3/21  4/3  4/8  4/10  4/15 4/17 4/22   Manual                 L knee PROM + stretching      MM 8'  MM 8'                                           Ther-Ex                            Active warm-up AROM  Bike 5min AROM  Bike 5min Rec bike  8' L2 Rec " "bike  8' L2 Rec bike  8' L2 Rec bike  8' L3 Rec bike  8' L3 Rec bike  8' L3   PROM  5'  5'             ITB stretch   6x20\" 6x20\"             4-way SLR 3# 2x10 ea 3# 2x10 ea 2x10 ea 3# 2x10 ea 3#      Stand loan  2x10 ea 2#   Std Hip ABD/Ext 3# 2x10 ea (B) 3# 2x10 ea (B)             Partial Sq              90/90 active HS S     10x10\" 10x10\"  10x10\"       LAQ     2x10 5#  2x10 5#  2x10 5#       Prone quad S       10x15\"  S/L 10x10\"  Iván test + strap 10x10\"  S/L 10x10\"     L SL leg press     10x 35#  2x10 45# 10x 45#  2x10 55# 2x10 55#  10x 65# 3x10 65#   DL leg press     10x 95#  2x10 105# 10x 105#  2x10 115# 2x10 115#  10x 125# 3x10 125#   Deadlift / rack pull      3x5 10# from 8\" step 3x8 10# from 6\" step 2x10 10# from 6\" step   HEP review       5'     10' add isometrics at bedtime    Neuro Re-Ed                SLS Balance 10x10\" 10x10\" 10x max blue pad  3 way floor clock 15x ea B (3# aw on L ankle) + hip open gate 10x B + hip open gate 10x B    Lat Step Down 8in 3x10  8in 3x10  Unable 8\"    2x10 6\"  Unable 8\" 2x10 6\"   Heel slides + strap O-P    10x10\" 10x10\"      Bridges Gurpreet 2x10 5\" ea Gurpreet 2x10 5\" ea 2x10 on PB 2x10 on PB 3x10 on PB + 5#      Bosu fwd lunges     20x alt LE 15x alt LE 15x alt LE 2x10 alt LE   Bosu split squats      2x8 B 2x8 B 2x10 B   Prone TKE 2x10 5\" ea 2x10 5\" ea 2x10 5\" ea + 5# aw Stand TB  2x10 mtb   Stand loan  3x10 4#    PB wall squats       2x10 2x10 + hip add iso   TB marching           Seated PB rollout 3 way     10x ea 10x ea  2x10 alt LE otb   Ther-Act           Sidestepping      4 laps mirror otb 4 laps mirror otb 4 laps mirror otb   STS Bench c 1 pad  3x10 Bench c 1 pad  3x10 Bench  3x10 + hip abd iso btb Bench  2x10 + hip abd iso btb Bench  2x10 5# + hip abd iso btb  Bench  2x10 10# Bench  2x10 10#   L FSU     20x 8\" Runner's step ups 15x alt LE 8\" step Runner's step ups 20x alt LE 8\" step Runner's step ups 20x alt LE 8\" step 3# aw's     L LSU   20x 8\"        L X-LSU   20x " "6\"         Modalities                                                                            "

## 2024-04-24 ENCOUNTER — OFFICE VISIT (OUTPATIENT)
Age: 59
End: 2024-04-24
Payer: COMMERCIAL

## 2024-04-24 DIAGNOSIS — G89.29 CHRONIC PAIN OF LEFT KNEE: Primary | ICD-10-CM

## 2024-04-24 DIAGNOSIS — M25.562 CHRONIC PAIN OF LEFT KNEE: Primary | ICD-10-CM

## 2024-04-24 PROCEDURE — 97530 THERAPEUTIC ACTIVITIES: CPT

## 2024-04-24 PROCEDURE — 97110 THERAPEUTIC EXERCISES: CPT

## 2024-04-24 PROCEDURE — 97112 NEUROMUSCULAR REEDUCATION: CPT

## 2024-04-24 NOTE — PROGRESS NOTES
"Daily Note     Today's date: 2024  Patient name: Pam Montalvo  : 1965  MRN: [id number]  Referring provider: Crystal Hoyt*  Dx:   Encounter Diagnosis     ICD-10-CM    1. 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  States that pinching pain sensation persists.  Compliant with stretches before bed.  States that pain sensation might be slightly worse recently due to steroid effect wearing off.  Approved for MRI.        Objective: See treatment diary below      Assessment: Tolerated treatment well. Patient would benefit from continued PT.  Pt able to tolerate continued and gradual progression of planned therapeutic interventions.  Pt with mild L foot numbness during tx session - discussed potential causes.  Advised pt to take note when it happens typically.  Pt notes that it happens when sitting on the floor at work, but today it occurred when performing SLR.  Mild buckling noted during tx session associated with medial L knee discomfort - utilized \"VMO targeted\" interventions towards end of tx session.  1:1 with Son Morales DPT entirety of tx.        Plan: Continue per plan of care.  Progress treatment as tolerated.       Precautions: Hx of CA     Daily Treatment Diary:      Initial Evaluation Date: 24  Auth:  12v or 2024  Compliance  3/19  3/21  4/3  4/8 4/10 4/15 4/17 4/22 4/24   Visit Number  5  6 1  2 3 4 5 6 7   Re-Eval       RE          Foto Captured      y                  3/21  4/3  4/8  4/10  4/15 4/17 4/22 4/24   Manual                L knee PROM + stretching    MM 8'  MM 8'                                          Ther-Ex                           Active warm-up AROM  Bike 5min Rec bike  8' L2 Rec bike  8' L2 Rec bike  8' L2 Rec bike  8' L3 Rec bike  8' L3 Rec bike  8' L3 Rec " "bike  8' L3   PROM  5'              ITB stretch  6x20\"              4-way SLR 3# 2x10 ea 2x10 ea 3# 2x10 ea 3#      Stand loan  2x10 ea 2# Flex only w/ VMO target 2x10   Std Hip ABD/Ext 3# 2x10 ea (B)              Partial Sq              90/90 active HS S   10x10\" 10x10\"  10x10\"        LAQ   2x10 5#  2x10 5#  2x10 5#        Prone quad S     10x15\"  S/L 10x10\"  Iván test + strap 10x10\"  S/L 10x10\"      L SL leg press    10x 35#  2x10 45# 10x 45#  2x10 55# 2x10 55#  10x 65# 3x10 65# 3x10 65#   DL leg press    10x 95#  2x10 105# 10x 105#  2x10 115# 2x10 115#  10x 125# 3x10 125# 10x 125#  2x10 135#   Deadlift / rack pull     3x5 10# from 8\" step 3x8 10# from 6\" step 2x10 10# from 6\" step    HEP review     5'     10' add isometrics at bedtime     Neuro Re-Ed               SLS Balance 10x10\" 10x max blue pad  3 way floor clock 15x ea B (3# aw on L ankle) + hip open gate 10x B + hip open gate 10x B     Lat Step Down 8in 3x10  Unable 8\"    2x10 6\"  Unable 8\" 2x10 6\"    Ecc fwd step down        2x10 4\"   Heel slides + strap O-P   10x10\" 10x10\"       Bridges Gurpreet 2x10 5\" ea 2x10 on PB 2x10 on PB 3x10 on PB + 5#    2x10 + hip add squeeze 4kg med ball   Bosu fwd lunges    20x alt LE 15x alt LE 15x alt LE 2x10 alt LE    Bosu split squats     2x8 B 2x8 B 2x10 B    Prone TKE 2x10 5\" ea 2x10 5\" ea + 5# aw Stand TB  2x10 mtb   Stand loan  3x10 4#     PB wall squats      2x10 2x10 + hip add iso    TB marching           SL RDLs        Contra knee on bench 3x8 10#   Seated PB rollout 3 way    10x ea 10x ea  2x10 alt LE otb    Ther-Act           Sidestepping     4 laps mirror otb 4 laps mirror otb 4 laps mirror otb    Loan retro walking        15x 6#   STS Bench c 1 pad  3x10 Bench  3x10 + hip abd iso btb Bench  2x10 + hip abd iso btb Bench  2x10 5# + hip abd iso btb  Bench  2x10 10# Bench  2x10 10# 3x8 15# chair   L FSU   20x 8\" Runner's step ups 15x alt LE 8\" step Runner's step ups 20x alt LE 8\" step Runner's step ups 20x alt " "LE 8\" step 3# aw's      L LSU  20x 8\"         L X-LSU  20x 6\"          Modalities                                                                            "

## 2024-04-29 ENCOUNTER — OFFICE VISIT (OUTPATIENT)
Age: 59
End: 2024-04-29
Payer: COMMERCIAL

## 2024-04-29 DIAGNOSIS — M25.562 CHRONIC PAIN OF LEFT KNEE: Primary | ICD-10-CM

## 2024-04-29 DIAGNOSIS — G89.29 CHRONIC PAIN OF LEFT KNEE: Primary | ICD-10-CM

## 2024-04-29 PROCEDURE — 97110 THERAPEUTIC EXERCISES: CPT

## 2024-04-29 PROCEDURE — 97112 NEUROMUSCULAR REEDUCATION: CPT

## 2024-04-29 PROCEDURE — 97530 THERAPEUTIC ACTIVITIES: CPT

## 2024-04-29 NOTE — PROGRESS NOTES
"Daily Note     Today's date: 2024  Patient name: Pam Montalvo  : 1965  MRN: [id number]  Referring provider: Crystal Hoyt*  Dx:   Encounter Diagnosis     ICD-10-CM    1. Chronic pain of left knee  M25.562     G89.29           Start Time: 1630  Stop Time: 1745  Total time in clinic (min): 75 minutes    Subjective: Pt notes continued medial and lateral L knee discomfort prior to start of tx session.  States that pain is less at night due to performing stretched prior to going to bed.  Continued L foot numbness noted.       Objective: See treatment diary below      Assessment: Tolerated treatment well. Patient would benefit from continued PT.  Pt able to tolerate continued and progressive introduction and variation of planned therapeutic interventions.  Challenged with more dynamic exercises and single leg targeted interventions.  Utilized exercises bilaterally this visit.  R ankle is limiting for RLE.  Introduced dynamic stretching to facilitate muscle length improvement.  Mild fatigue noted post-session.  1:1 with Son Morales DPT entirety of tx.        Plan: Continue per plan of care.  Progress treatment as tolerated.       Precautions: Hx of CA     Daily Treatment Diary:      Initial Evaluation Date: 24  Auth:  12v or 2024  Compliance  3/19  3/21  4/3  4/8 4/10 4/15 4/17 4/22 4/24 4/29   Visit Number  5  6 1  2 3 4 5 6 7 8   Re-Eval       RE           Foto Captured      y                   4/8  4/10  4/15 4/17 4/22 4/24 4/29   Manual             L knee PROM + stretching  MM 8'                                     Ther-Ex                       Active warm-up Rec bike  8' L2 Rec bike  8' L2 Rec bike  8' L3 Rec bike  8' L3 Rec bike  8' L3 Rec bike  8' L3 Rec bike  8' L3   PROM             ITB stretch              4-way SLR 2x10 ea 3#      Stand loan  2x10 ea 2# Flex only w/ VMO target 2x10    Std Hip ABD/Ext             Partial Sq             90/90 active HS S 10x10\"  10x10\"       " "  LAQ 2x10 5#  2x10 5#         Prone quad S 10x15\"  S/L 10x10\"  Iván test + strap 10x10\"  S/L 10x10\"       L SL leg press  10x 35#  2x10 45# 10x 45#  2x10 55# 2x10 55#  10x 65# 3x10 65# 3x10 65# 2x10 65#   DL leg press  10x 95#  2x10 105# 10x 105#  2x10 115# 2x10 115#  10x 125# 3x10 125# 10x 125#  2x10 135# 2x10 135#   Deadlift / rack pull   3x5 10# from 8\" step 3x8 10# from 6\" step 2x10 10# from 6\" step     Lat lunges + contra hip add S       2x10 B   HEP review 5'     10' add isometrics at bedtime      Neuro Re-Ed            SLS Balance  3 way floor clock 15x ea B (3# aw on L ankle) + hip open gate 10x B + hip open gate 10x B      Lat Step Down    2x10 6\"  Unable 8\" 2x10 6\"     Ecc fwd step down      2x10 4\" 2x10 B 4\"   Heel slides + strap O-P 10x10\" 10x10\"        Bridges 2x10 on PB 3x10 on PB + 5#    2x10 + hip add squeeze 4kg med ball    Bosu fwd lunges  20x alt LE 15x alt LE 15x alt LE 2x10 alt LE     Bosu split squats   2x8 B 2x8 B 2x10 B     Bosu squats (on flat side)       2x10   Prone TKE Stand TB  2x10 mtb   Stand chuy  3x10 4#      PB wall squats    2x10 2x10 + hip add iso     TB marching          SL RDLs      Contra knee on bench 3x8 10# Kickstand 2x8 B 10#   Dynamic HS S       3x5 steps B   Seated PB rollout 3 way  10x ea 10x ea  2x10 alt LE otb     Ther-Act          Sidestepping   4 laps mirror otb 4 laps mirror otb 4 laps mirror otb     Chuy retro walking      15x 6# 15x 7#   STS Bench  2x10 + hip abd iso btb Bench  2x10 5# + hip abd iso btb  Bench  2x10 10# Bench  2x10 10# 3x8 15# chair SL from 26.5\" plinth + contra UE assist 3x6 B   L FSU Runner's step ups 15x alt LE 8\" step Runner's step ups 20x alt LE 8\" step Runner's step ups 20x alt LE 8\" step 3# aw's       L LSU          L X-LSU           Modalities                                                                      "

## 2024-05-01 ENCOUNTER — OFFICE VISIT (OUTPATIENT)
Age: 59
End: 2024-05-01
Payer: COMMERCIAL

## 2024-05-01 DIAGNOSIS — M25.562 CHRONIC PAIN OF LEFT KNEE: Primary | ICD-10-CM

## 2024-05-01 DIAGNOSIS — G89.29 CHRONIC PAIN OF LEFT KNEE: Primary | ICD-10-CM

## 2024-05-01 PROCEDURE — 97110 THERAPEUTIC EXERCISES: CPT

## 2024-05-01 PROCEDURE — 97530 THERAPEUTIC ACTIVITIES: CPT

## 2024-05-01 PROCEDURE — 97112 NEUROMUSCULAR REEDUCATION: CPT

## 2024-05-01 NOTE — PROGRESS NOTES
Daily Note     Today's date: 2024  Patient name: Pam Montalvo  : 1965  MRN: [id number]  Referring provider: Crystal Hoyt*  Dx:   Encounter Diagnosis     ICD-10-CM    1. Chronic pain of left knee  M25.562     G89.29           Start Time: 1630  Stop Time: 1730  Total time in clinic (min): 60 minutes    Subjective: Pt reports continued L knee medial pain but is steadily improving.  States lateral L thigh/hip soreness prior to start of tx session.  She states that ankle pain is improving as well but not fully alleviated.        Objective: See treatment diary below      Assessment: Tolerated treatment well. Patient would benefit from continued PT.  Pt able to tolerate continued progression of planned therapeutic interventions this visit. Pt with several instances of foot numbness during visit - attempted to use nerve glides/tensioners to alleviate these symptoms.  Pt returned to baseline following exercises.  Mild calf cramping during visit as well - utilized gastroc stretching to alleviate cramp sensation.  Pt with mild fatigue post-session.  Balance deficit limits several exercises.  1:1 with Son Morales DPT entirety of tx.        Plan: Continue per plan of care.  Progress treatment as tolerated.       Precautions: Hx of CA     Daily Treatment Diary:      Initial Evaluation Date: 24  Auth:  12v or 2024  Compliance  4/3  4/8 4/10 4/15 4/17 4/22 4/24 4/29 5/1   Visit Number 1  2 3 4 5 6 7 8 9   Re-Eval   RE            Foto Captured  y                    4/8  4/10  4/15 4/17 4/22 4/24 4/29 5/1   Manual              L knee PROM + stretching  MM 8'                                        Ther-Ex                         Active warm-up Rec bike  8' L2 Rec bike  8' L2 Rec bike  8' L3 Rec bike  8' L3 Rec bike  8' L3 Rec bike  8' L3 Rec bike  8' L3 Rec bike  8' L3   PROM              ITB stretch               4-way SLR 2x10 ea 3#      Stand loan  2x10 ea 2# Flex only w/ VMO target 2x10    "  Std Hip ABD/Ext              Partial Sq              90/90 active HS S 10x10\"  10x10\"       + nerve tensioners 15x B   LAQ 2x10 5#  2x10 5#          Prone quad S 10x15\"  S/L 10x10\"  Iván test + strap 10x10\"  S/L 10x10\"        L SL leg press  10x 35#  2x10 45# 10x 45#  2x10 55# 2x10 55#  10x 65# 3x10 65# 3x10 65# 2x10 65#    DL leg press  10x 95#  2x10 105# 10x 105#  2x10 115# 2x10 115#  10x 125# 3x10 125# 10x 125#  2x10 135# 2x10 135#    Deadlift / rack pull   3x5 10# from 8\" step 3x8 10# from 6\" step 2x10 10# from 6\" step      Lat lunges + contra hip add S       2x10 B 2x10 B 3\" hold   Gastroc prostretch        3x30\" B   HEP review 5'     10' add isometrics at bedtime       Neuro Re-Ed             SLS Balance  3 way floor clock 15x ea B (3# aw on L ankle) + hip open gate 10x B + hip open gate 10x B       Lat Step Down    2x10 6\"  Unable 8\" 2x10 6\"      Ecc fwd step down      2x10 4\" 2x10 B 4\"    Heel slides + strap O-P 10x10\" 10x10\"         Bridges 2x10 on PB 3x10 on PB + 5#    2x10 + hip add squeeze 4kg med ball     Bosu fwd lunges  20x alt LE 15x alt LE 15x alt LE 2x10 alt LE      Bosu split squats   2x8 B 2x8 B 2x10 B      Bosu squats (on flat side)       2x10 3x10   Prone TKE Stand TB  2x10 mtb   Stand chuy  3x10 4#       PB wall squats    2x10 2x10 + hip add iso      TB marching           SL RDLs      Contra knee on bench 3x8 10# Kickstand 2x8 B 10#    Dynamic HS S       3x5 steps B 3x5 steps B    2x10 B LE elevated on chair   Seated PB rollout 3 way  10x ea 10x ea  2x10 alt LE otb      Ther-Act           Sidestepping   4 laps mirror otb 4 laps mirror otb 4 laps mirror otb   3 laps mirror gtb   Monster walk        3 laps mirror gtb fwd/bwd   Chuy retro walking      15x 6# 15x 7#    STS Bench  2x10 + hip abd iso btb Bench  2x10 5# + hip abd iso btb  Bench  2x10 10# Bench  2x10 10# 3x8 15# chair SL from 26.5\" plinth + contra UE assist 3x6 B    L FSU Runner's step ups 15x alt LE 8\" step Runner's step ups " "20x alt LE 8\" step Runner's step ups 20x alt LE 8\" step 3# aw's        L LSU           L X-LSU            Modalities                                                                            "

## 2024-05-08 ENCOUNTER — OFFICE VISIT (OUTPATIENT)
Age: 59
End: 2024-05-08
Payer: COMMERCIAL

## 2024-05-08 DIAGNOSIS — M25.562 CHRONIC PAIN OF LEFT KNEE: Primary | ICD-10-CM

## 2024-05-08 DIAGNOSIS — G89.29 CHRONIC PAIN OF LEFT KNEE: Primary | ICD-10-CM

## 2024-05-08 PROCEDURE — 97112 NEUROMUSCULAR REEDUCATION: CPT

## 2024-05-08 PROCEDURE — 97530 THERAPEUTIC ACTIVITIES: CPT

## 2024-05-08 PROCEDURE — 97110 THERAPEUTIC EXERCISES: CPT

## 2024-05-08 NOTE — PROGRESS NOTES
"Daily Note     Today's date: 2024  Patient name: Pam Montalvo  : 1965  MRN: [id number]  Referring provider: Crystal Hoyt*  Dx:   Encounter Diagnosis     ICD-10-CM    1. Chronic pain of left knee  M25.562     G89.29           Start Time: 1630  Stop Time: 1730  Total time in clinic (min): 60 minutes    Subjective: Pt notes that L knee feels like a \"roller coaster ride where some times I feel good and other times it really bothers me.\"  L knee stiffness and pain persists.  MRI 5/3/24.  Has to schedule appt with ortho as follow-up.      Objective: See treatment diary below      Assessment: Tolerated treatment well. Patient would benefit from continued PT.  Pt able to tolerate continued and gradual progression of planned therapeutic interventions this visit.  Challenged with exercises incorporating balance tasks.  Pt utilizes RLE to push off when performing FSU - VC's and demonstration provided to attempt to perform FSU with minimal RLE compensatory pattern.  Went into detail about MRI findings and educated pt on prognosis moving forward.  Mild tingling noted post-session - potentially due to Baker's cyst finding on MRI.  1:1 with Son Morales DPT entirety of tx.        Plan: Continue per plan of care.  Progress treatment as tolerated.       Precautions: Hx of CA     Daily Treatment Diary:      Initial Evaluation Date: 24  Auth:  12v or 2024  Compliance  4/3  4/8 4/10 4/15 4/17 4/22 4/24 4/29 5/1 5/8   Visit Number 1  2 3 4 5 6 7 8 9 10   Re-Eval   RE             Foto Captured  y          y           4/10  4/15 4/17 4/22 4/24 4/29 5/1 5/8   Manual             L knee PROM + stretching                                       Ther-Ex                        Active warm-up Rec bike  8' L2 Rec bike  8' L3 Rec bike  8' L3 Rec bike  8' L3 Rec bike  8' L3 Rec bike  8' L3 Rec bike  8' L3 Rec bike 8' L4   PROM             ITB stretch              4-way SLR      Stand loan  2x10 ea 2# Flex only w/ " "VMO target 2x10   Flex only w/ VMO target 2x10 2#   Std Hip ABD/Ext             Partial Sq             90/90 active HS S  10x10\"       + nerve tensioners 15x B    LAQ  2x10 5#           Prone quad S  S/L 10x10\"  Iván test + strap 10x10\"  S/L 10x10\"         L SL leg press 10x 35#  2x10 45# 10x 45#  2x10 55# 2x10 55#  10x 65# 3x10 65# 3x10 65# 2x10 65#  3x10 65#   DL leg press 10x 95#  2x10 105# 10x 105#  2x10 115# 2x10 115#  10x 125# 3x10 125# 10x 125#  2x10 135# 2x10 135#     Deadlift / rack pull  3x5 10# from 8\" step 3x8 10# from 6\" step 2x10 10# from 6\" step       Lat lunges + contra hip add S      2x10 B 2x10 B 3\" hold    Gastroc prostretch       3x30\" B 5x20\" B + HS S   HEP review     10' add isometrics at bedtime        Neuro Re-Ed             SLS Balance 3 way floor clock 15x ea B (3# aw on L ankle) + hip open gate 10x B + hip open gate 10x B        Lat Step Down   2x10 6\"  Unable 8\" 2x10 6\"       Ecc fwd step down     2x10 4\" 2x10 B 4\"     Heel slides + strap O-P 10x10\"          Bridges 3x10 on PB + 5#    2x10 + hip add squeeze 4kg med ball   2x10 feet on bosu   Bosu fwd lunges 20x alt LE 15x alt LE 15x alt LE 2x10 alt LE       Bosu split squats  2x8 B 2x8 B 2x10 B       Bosu squats (on flat side)      2x10 3x10    Prone TKE   Stand loan  3x10 4#        PB wall squats   2x10 2x10 + hip add iso       TB marching           SL RDLs     Contra knee on bench 3x8 10# Kickstand 2x8 B 10#     Dynamic HS S      3x5 steps B 3x5 steps B    2x10 B LE elevated on chair 3x5 steps B   Seated PB rollout 3 way 10x ea 10x ea  2x10 alt LE otb       Ther-Act           Sidestepping  4 laps mirror otb 4 laps mirror otb 4 laps mirror otb   3 laps mirror gtb 4 laps mirror gtb   Monster walk       3 laps mirror gtb fwd/bwd 4 laps mirror gtb fwb/bwd   [place] retro walking     15x 6# 15x 7#     STS Bench  2x10 5# + hip abd iso btb  Bench  2x10 10# Bench  2x10 10# 3x8 15# chair SL from 26.5\" plinth + contra UE assist 3x6 B  3x8 20# " "chair   L FSU Runner's step ups 20x alt LE 8\" step Runner's step ups 20x alt LE 8\" step 3# aw's      Runner's step ups 20x alt LE 8\" step   L LSU           L X-LSU            Modalities                                                                            "

## 2024-05-13 ENCOUNTER — OFFICE VISIT (OUTPATIENT)
Age: 59
End: 2024-05-13
Payer: COMMERCIAL

## 2024-05-13 DIAGNOSIS — G89.29 CHRONIC PAIN OF LEFT KNEE: Primary | ICD-10-CM

## 2024-05-13 DIAGNOSIS — M25.562 CHRONIC PAIN OF LEFT KNEE: Primary | ICD-10-CM

## 2024-05-13 PROCEDURE — 97110 THERAPEUTIC EXERCISES: CPT | Performed by: PHYSICAL THERAPIST

## 2024-05-13 PROCEDURE — 97530 THERAPEUTIC ACTIVITIES: CPT | Performed by: PHYSICAL THERAPIST

## 2024-05-13 PROCEDURE — 97112 NEUROMUSCULAR REEDUCATION: CPT | Performed by: PHYSICAL THERAPIST

## 2024-05-13 NOTE — PROGRESS NOTES
"Daily Note     Today's date: 2024  Patient name: Pam Montalvo  : 1965  MRN: [id number]  Referring provider: Crystal Hoyt*  Dx:   Encounter Diagnosis     ICD-10-CM    1. Chronic pain of left knee  M25.562     G89.29           Start Time: 1620  Stop Time: 1705  Total time in clinic (min): 45 minutes    Subjective: Was able to bike ride over weekend, but had increased pain post bike riding.     Objective: See treatment diary below    Assessment: Tolerated treatment well. Patient demonstrated fatigue post treatment, exhibited good technique with therapeutic exercises, and would benefit from continued PT 1:1 with Fernando Beaver DPT for entirety of tx.     Plan: Continue per plan of care.      Precautions: Hx of CA     Daily Treatment Diary:      Initial Evaluation Date: 24  Auth:  12v or 2024  Compliance  4/3  4/8 4/10 4/15 4/17 4/22 4/24 4/29 5/1 5/8 5/13   Visit Number 1  2 3 4 5 6 7 8 9 10 11   Re-Eval   RE              Foto Captured  y          y            4/10  4/15 4/17 4/22 4/24 4/29 5/1 5/8 5/13   Manual              L knee PROM + stretching                                          Ther-Ex                          Active warm-up Rec bike  8' L2 Rec bike  8' L3 Rec bike  8' L3 Rec bike  8' L3 Rec bike  8' L3 Rec bike  8' L3 Rec bike  8' L3 Rec bike 8' L4 Rec bike 8' L4   PROM              ITB stretch               4-way SLR      Stand loan  2x10 ea 2# Flex only w/ VMO target 2x10   Flex only w/ VMO target 2x10 2# Flex only w/ VMO target 2x10 2#   Std Hip ABD/Ext              Partial Sq              90/90 active HS S  10x10\"       + nerve tensioners 15x B     LAQ  2x10 5#            Prone quad S  S/L 10x10\"  Iván test + strap 10x10\"  S/L 10x10\"          L SL leg press 10x 35#  2x10 45# 10x 45#  2x10 55# 2x10 55#  10x 65# 3x10 65# 3x10 65# 2x10 65#  3x10 65# 3x10 65#   DL leg press 10x 95#  2x10 105# 10x 105#  2x10 115# 2x10 115#  10x 125# 3x10 125# 10x 125#  2x10 135# " "2x10 135#      Deadlift / rack pull  3x5 10# from 8\" step 3x8 10# from 6\" step 2x10 10# from 6\" step        Lat lunges + contra hip add S      2x10 B 2x10 B 3\" hold     Gastroc prostretch       3x30\" B 5x20\" B + HS S 5x20\" B + HS S   HEP review     10' add isometrics at bedtime         Neuro Re-Ed              SLS Balance 3 way floor clock 15x ea B (3# aw on L ankle) + hip open gate 10x B + hip open gate 10x B         Lat Step Down   2x10 6\"  Unable 8\" 2x10 6\"        Ecc fwd step down     2x10 4\" 2x10 B 4\"      Heel slides + strap O-P 10x10\"           Bridges 3x10 on PB + 5#    2x10 + hip add squeeze 4kg med ball   2x10 feet on bosu T/S on bosu  3x10   Bosu fwd lunges 20x alt LE 15x alt LE 15x alt LE 2x10 alt LE        Bosu split squats  2x8 B 2x8 B 2x10 B        Bosu squats (on flat side)      2x10 3x10     Prone TKE   Stand loan  3x10 4#         PB wall squats   2x10 2x10 + hip add iso        TB marching            SL RDLs     Contra knee on bench 3x8 10# Kickstand 2x8 B 10#      Dynamic HS S      3x5 steps B 3x5 steps B    2x10 B LE elevated on chair 3x5 steps B    Seated PB rollout 3 way 10x ea 10x ea  2x10 alt LE otb        Ther-Act            Sidestepping  4 laps mirror otb 4 laps mirror otb 4 laps mirror otb   3 laps mirror gtb 4 laps mirror gtb 4 laps mirror gtb   Monster walk       3 laps mirror gtb fwd/bwd 4 laps mirror gtb fwb/bwd 4 laps mirror gtb fwb/bwd   [place] retro walking     15x 6# 15x 7#      STS Bench  2x10 5# + hip abd iso btb  Bench  2x10 10# Bench  2x10 10# 3x8 15# chair SL from 26.5\" plinth + contra UE assist 3x6 B  3x8 20# chair 3x8 20# chair   L FSU Runner's step ups 20x alt LE 8\" step Runner's step ups 20x alt LE 8\" step 3# aw's      Runner's step ups 20x alt LE 8\" step Runner's step ups 20x alt LE 8\" step   L LSU            L X-LSU             Modalities                                                                                  "

## 2024-05-15 ENCOUNTER — EVALUATION (OUTPATIENT)
Age: 59
End: 2024-05-15
Payer: COMMERCIAL

## 2024-05-15 DIAGNOSIS — G89.29 CHRONIC PAIN OF LEFT KNEE: Primary | ICD-10-CM

## 2024-05-15 DIAGNOSIS — M25.562 CHRONIC PAIN OF LEFT KNEE: Primary | ICD-10-CM

## 2024-05-15 PROCEDURE — 97164 PT RE-EVAL EST PLAN CARE: CPT

## 2024-05-15 PROCEDURE — 97530 THERAPEUTIC ACTIVITIES: CPT

## 2024-05-15 PROCEDURE — 97112 NEUROMUSCULAR REEDUCATION: CPT

## 2024-05-15 PROCEDURE — 97110 THERAPEUTIC EXERCISES: CPT

## 2024-05-15 NOTE — PROGRESS NOTES
PT Re-Evaluation     Today's date: 5/15/2024  Patient name: Pam Montalvo  : 1965  MRN: [id number]  Referring provider: Crystal Hoyt*  Dx:   Encounter Diagnosis     ICD-10-CM    1. Chronic pain of left knee  M25.562     G89.29           Start Time: 1630  Stop Time: 1730  Total time in clinic (min): 60 minutes    Subjective: Pt reports that she is able to perform most tasks but has to perform some of them gingerly to prevent aggravation of pain symptoms.  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  Descending stair negotiation more difficult than ascending.  Unable to run at this time, but she states she can perform a very light jog when going with children at work.  Is unable to tolerate full weight bearing onto L knee in a kneeling position.  Pain intensity at worst is 8/10 at night.  Pain at night continues to be most aggravated.  Compliant with HEP.  Has ortho appt in several weeks - surgical consult secondary to MRI results.  Went for short bike ride this weekend.        Objective: See treatment diary below    Tenderness   Left Knee   Tenderness in the medial and lateral joint line.     Active Range of Motion   Left Knee   Flexion: 124 degrees   Extension: -6 degrees with pain    Passive Range of Motion   Left Knee   Flexion: 135 degrees   Extension: -3 degrees with pain    Strength/Myotome Testing     Left Knee   Left knee flexion strength: 5/5 MMT +pain  Left knee extension strength: 4+/5 MMT +pain    Right Knee   Right knee flexion strength: 5/5 MMT  Right knee extension strength: 5/5 MMT    Meniscus Testing  [place]:  + medial  [place]:  + medial    FOTO = 64    Assessment: Functionality is gradually improving.  Self-reported FOTO score remains relatively unchanged at this time though due to pt's perceived pain intensity with tasks.  Better able to perform tasks around the home.  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  Meniscus testing continued to show medial meniscus involvement.  Discussed potential options that would be provided to her from ortho appt in the near future.  Strength and mobility is improved since last visit.  Continuing to work towards both short-term and long-term goals.  Tolerated treatment well. Patient would benefit from continued PT.  1:1 with Son Morales, MARYJANET entirety of tx.    Assessment details: Pam Montalvo presents to PT with chronic Hx of insidious onset L knee pain. Significant findings from examination include: mild extension deficit, TTP medial joint line, strong/painful contractile testing. These findings are consistent with referring Dx of medial knee DJD, limiting their functional mobility. Pt would benefit from course of PT to resolve the above mentioned impairments and facilitate return to PLOF.     Impairments: abnormal or restricted ROM, impaired physical strength, lacks appropriate home exercise program and pain with function    Symptom irritability: moderateUnderstanding of Dx/Px/POC: good   Prognosis: good    Goals  Short Term Functional Goals:   Patient to be independent with HEP to maximize improvement in functional mobility. - MET  Decrease L Knee pain to 3 on a scale of 0-10 to allow dressing and improved stair climbing. - ONGOING  pt will increase FOTO score by 10pts to reflect a statistically significant improvement. - ONGOING       Long Term Functional Goals (Goals for patient at discharge):     Improve functional mobility: Patient will stand for 90 minutes and walk 2 blocks with less than 3/10 pain. - MET  Patient will ascend/descend 2 flight of stairs with less than 2/10 pain. - ONGOING  pt will score at or above predicted discharge FOTO score of 80 to reflect improvement in subjective functional ability. - ONGOING    Plan: Continue per plan of care.  Progress treatment as tolerated.      Patient would benefit  "from: skilled physical therapy  Referral necessary: No  Planned therapy interventions: manual therapy, neuromuscular re-education, patient education, therapeutic activities, therapeutic exercise and home exercise program  Frequency: 2x week  Duration in weeks: 6  Plan of Care beginning date: 5/15/2024  Plan of Care expiration date: 6/26/2024  Treatment plan discussed with: patient     Precautions: Hx of CA     Daily Treatment Diary:      Initial Evaluation Date: 02/27/24  Auth:  Pending  Compliance  4/3  4/8 4/10 4/15 4/17 4/22 4/24 4/29 5/1 5/8 5/13 5/15   Visit Number 1  2 3 4 5 6 7 8 9 10 11 12   Re-Eval   RE               Foto Captured  y          y  y          4/17 4/22 4/24 4/29 5/1 5/8 5/13 5/15   Manual           L knee PROM + stretching                                 Ther-Ex                      Active warm-up Rec bike  8' L3 Rec bike  8' L3 Rec bike  8' L3 Rec bike  8' L3 Rec bike  8' L3 Rec bike 8' L4 Rec bike 8' L4 Rec bike 8' L4   PROM           ITB stretch            4-way SLR  Stand loan  2x10 ea 2# Flex only w/ VMO target 2x10   Flex only w/ VMO target 2x10 2# Flex only w/ VMO target 2x10 2# 2x10 ea 2#   Std Hip ABD/Ext           Partial Sq           90/90 active HS S     + nerve tensioners 15x B      LAQ           Prone quad S           L SL leg press 2x10 55#  10x 65# 3x10 65# 3x10 65# 2x10 65#  3x10 65# 3x10 65#    DL leg press 2x10 115#  10x 125# 3x10 125# 10x 125#  2x10 135# 2x10 135#       Deadlift / rack pull 3x8 10# from 6\" step 2x10 10# from 6\" step         Lat lunges + contra hip add S    2x10 B 2x10 B 3\" hold      Gastroc prostretch     3x30\" B 5x20\" B + HS S 5x20\" B + HS S    HEP review 10' add isometrics at bedtime          Neuro Re-Ed           SLS Balance + hip open gate 10x B          Lat Step Down 2x10 6\"  Unable 8\" 2x10 6\"         Ecc fwd step down   2x10 4\" 2x10 B 4\"       Heel slides + strap O-P           Bridges   2x10 + hip add squeeze 4kg med ball   2x10 feet on bosu T/S on " "bosu  3x10 2x10 feet on bosu   Bosu fwd lunges 15x alt LE 2x10 alt LE         Bosu split squats 2x8 B 2x10 B         Bosu squats (on flat side)    2x10 3x10      Prone TKE Stand chuy  3x10 4#          PB wall squats 2x10 2x10 + hip add iso         TB marching           SL RDLs   Contra knee on bench 3x8 10# Kickstand 2x8 B 10#       Dynamic HS S    3x5 steps B 3x5 steps B    2x10 B LE elevated on chair 3x5 steps B  3x5 steps B   Seated PB rollout 3 way  2x10 alt LE otb         Ther-Act           Sidestepping 4 laps mirror otb 4 laps mirror otb   3 laps mirror gtb 4 laps mirror gtb 4 laps mirror gtb    Monster walk     3 laps mirror gtb fwd/bwd 4 laps mirror gtb fwb/bwd 4 laps mirror gtb fwb/bwd    [place] retro walking   15x 6# 15x 7#       STS Bench  2x10 10# Bench  2x10 10# 3x8 15# chair SL from 26.5\" plinth + contra UE assist 3x6 B  3x8 20# chair 3x8 20# chair Tempo  2x10 10#  5\" ascend/ descend   L FSU      Runner's step ups 20x alt LE 8\" step Runner's step ups 20x alt LE 8\" step    L LSU           L X-LSU            Modalities                                                                            "

## 2024-05-20 ENCOUNTER — TELEPHONE (OUTPATIENT)
Age: 59
End: 2024-05-20

## 2024-05-20 ENCOUNTER — OFFICE VISIT (OUTPATIENT)
Age: 59
End: 2024-05-20
Payer: COMMERCIAL

## 2024-05-20 DIAGNOSIS — M25.562 CHRONIC PAIN OF LEFT KNEE: Primary | ICD-10-CM

## 2024-05-20 DIAGNOSIS — G89.29 CHRONIC PAIN OF LEFT KNEE: Primary | ICD-10-CM

## 2024-05-20 PROCEDURE — 97112 NEUROMUSCULAR REEDUCATION: CPT

## 2024-05-20 PROCEDURE — 97530 THERAPEUTIC ACTIVITIES: CPT

## 2024-05-20 PROCEDURE — 97110 THERAPEUTIC EXERCISES: CPT

## 2024-05-20 NOTE — PROGRESS NOTES
Daily Note     Today's date: 2024  Patient name: Pam Montalvo  : 1965  MRN: [id number]  Referring provider: Crystal Hoyt*  Dx:   Encounter Diagnosis     ICD-10-CM    1. Chronic pain of left knee  M25.562     G89.29           Start Time: 1630  Stop Time: 1709  Total time in clinic (min): 39 minutes    Subjective: Pt reports going for a 2 mile walk this weekend, but towards the end of the 2 miles the L knee started buckling       Objective: See treatment diary below      Assessment: Tolerated treatment well. Patient would benefit from continued PT.  Tx session focused on functional/upright interventions to address fatiguing element causing buckling with prolonged walking.  No buckling observed throughout tx session.  Mild fatigue post-session.  Introduced marching suitcase carry and dynamic HS stretch with LE on elevated surface.  Unable to fully complete eccentric fwd step down from 8 inch step height at this time.  1:1 with Son Morales DPT entirety of tx.        Plan: Continue per plan of care.  Progress treatment as tolerated.       Precautions: Hx of CA     Daily Treatment Diary:      Initial Evaluation Date: 24  Auth:  12v or 24  Compliance 4/10 4/15 4/17 4/22 4/24 4/29 5/1 5/8 5/13 5/15 5/20   Visit Number 3 4 5 6 7 8 9 10 11 12    Re-Eval           y    Foto Captured        y  y            5/8 5/13 5/15 5/20   Manual          L knee PROM + stretching                              Ther-Ex                    Active warm-up Rec bike  8' L3 Rec bike  8' L3 Rec bike  8' L3 Rec bike 8' L4 Rec bike 8' L4 Rec bike 8' L4 Rec bike 8' L4   PROM          ITB stretch           4-way SLR Flex only w/ VMO target 2x10   Flex only w/ VMO target 2x10 2# Flex only w/ VMO target 2x10 2# 2x10 ea 2#    Std Hip ABD/Ext          Partial Sq          90/90 active HS S   + nerve tensioners 15x B       LAQ          Prone quad S          L SL leg press 3x10 65# 2x10 65#  3x10 65# 3x10 65#    "  DL leg press 10x 125#  2x10 135# 2x10 135#        Deadlift / rack pull          Lat lunges + contra hip add S  2x10 B 2x10 B 3\" hold       Gastroc prostretch   3x30\" B 5x20\" B + HS S 5x20\" B + HS S     HEP review          Neuro Re-Ed          SLS Balance          Lat Step Down          Ecc fwd step down 2x10 4\" 2x10 B 4\"        Heel slides + strap O-P          Bridges 2x10 + hip add squeeze 4kg med ball   2x10 feet on bosu T/S on bosu  3x10 2x10 feet on bosu    Bosu fwd lunges          Bosu split squats          Bosu squats (on flat side)  2x10 3x10       Prone TKE          PB wall squats          TB marching          SL RDLs Contra knee on bench 3x8 10# Kickstand 2x8 B 10#        Dynamic HS S  3x5 steps B 3x5 steps B    2x10 B LE elevated on chair 3x5 steps B  3x5 steps B 2x5 steps B  10x10\" B foot on chair   Suitcase carry + marching       2x10 step ea B 15#   Seated PB rollout 3 way          Ther-Act          Sidestepping   3 laps mirror gtb 4 laps mirror gtb 4 laps mirror gtb  4 laps mirror gtb   Monster walk   3 laps mirror gtb fwd/bwd 4 laps mirror gtb fwb/bwd 4 laps mirror gtb fwb/bwd  4 laps mirror gtb fwb/bwd   [place] retro walking 15x 6# 15x 7#     10x 7#   STS 3x8 15# chair SL from 26.5\" plinth + contra UE assist 3x6 B  3x8 20# chair 3x8 20# chair Tempo  2x10 10#  5\" ascend/ descend    L FSU    Runner's step ups 20x alt LE 8\" step Runner's step ups 20x alt LE 8\" step  + ecc fwd/bwd step downs 2x10 8\" step   L LSU          L X-LSU           Modalities                                                                          "

## 2024-05-22 ENCOUNTER — OFFICE VISIT (OUTPATIENT)
Age: 59
End: 2024-05-22
Payer: COMMERCIAL

## 2024-05-22 DIAGNOSIS — M25.562 CHRONIC PAIN OF LEFT KNEE: Primary | ICD-10-CM

## 2024-05-22 DIAGNOSIS — G89.29 CHRONIC PAIN OF LEFT KNEE: Primary | ICD-10-CM

## 2024-05-22 PROCEDURE — 97112 NEUROMUSCULAR REEDUCATION: CPT

## 2024-05-22 PROCEDURE — 97110 THERAPEUTIC EXERCISES: CPT

## 2024-05-22 PROCEDURE — 97530 THERAPEUTIC ACTIVITIES: CPT

## 2024-05-22 NOTE — PROGRESS NOTES
"Daily Note     Today's date: 2024  Patient name: Pam Montalvo  : 1965  MRN: [id number]  Referring provider: Crystal Hoyt*  Dx:   Encounter Diagnosis     ICD-10-CM    1. Chronic pain of left knee  M25.562     G89.29           Start Time: 1630  Stop Time: 1715  Total time in clinic (min): 45 minutes    Subjective: Pt reports severe pain Monday night when attempting to sleep.  Pain is not as bothersome throughout the day but remains painful at night when trying to sleep and get comfortable.        Objective: See treatment diary below      Assessment: Tolerated treatment well. Patient would benefit from continued PT.  Pt able to tolerate continuation of planned therapeutic interventions this tx session.  Continued to focus on functional training interventions, but utilized quadriceps and hamstring stretching to target discomfort areas.  Mild discomfort at patellar tendon region.  Slight discomfort when initially starting several interventions but resolved with further repetitions.  1:1 with Son Morales DPT entirety of tx.        Plan: Continue per plan of care.  Progress treatment as tolerated.       Precautions: Hx of CA     Daily Treatment Diary:      Initial Evaluation Date: 24  Auth:  12v or 24  Compliance    Visit Number 1 2   Re-Eval      Foto Captured            4/29 5/1 5/8 5/13 5/15 5/20 5/22   Manual          L knee PROM + stretching                              Ther-Ex          Pron quad S       4x30\" strap   Active warm-up Rec bike  8' L3 Rec bike  8' L3 Rec bike 8' L4 Rec bike 8' L4 Rec bike 8' L4 Rec bike 8' L4 Rec bike 8' L4   4-way SLR   Flex only w/ VMO target 2x10 2# Flex only w/ VMO target 2x10 2# 2x10 ea 2#     Std Hip ABD/Ext       15x ea B gtb   90/90 active HS S  + nerve tensioners 15x B     4x30\" strap   LAQ       3x10 5#   L SL leg press 2x10 65#  3x10 65# 3x10 65#   2x10 65#   DL leg press 2x10 135#         Deadlift / rack pull          Lat lunges " "+ contra hip add S 2x10 B 2x10 B 3\" hold        Gastroc prostretch  3x30\" B 5x20\" B + HS S 5x20\" B + HS S      HEP review          Neuro Re-Ed          SLS Balance          Lat Step Down          Ecc fwd step down 2x10 B 4\"         Heel slides + strap O-P          Bridges   2x10 feet on bosu T/S on bosu  3x10 2x10 feet on bosu     Bosu fwd lunges          Bosu split squats          Bosu squats (on flat side) 2x10 3x10        Prone TKE          PB wall squats          TB marching          SL RDLs Kickstand 2x8 B 10#         Dynamic HS S 3x5 steps B 3x5 steps B    2x10 B LE elevated on chair 3x5 steps B  3x5 steps B 2x5 steps B  10x10\" B foot on chair    Suitcase carry + marching      2x10 step ea B 15# 2x10 step ea B 15#   Ther-Act          Sidestepping  3 laps mirror gtb 4 laps mirror gtb 4 laps mirror gtb  4 laps mirror gtb 4 laps mirror gtb   Monster walk  3 laps mirror gtb fwd/bwd 4 laps mirror gtb fwb/bwd 4 laps mirror gtb fwb/bwd  4 laps mirror gtb fwb/bwd 4 laps mirror gtb fwb/bwd   [place] retro walking 15x 7#     10x 7# 10x 7#   STS SL from 26.5\" plinth + contra UE assist 3x6 B  3x8 20# chair 3x8 20# chair Tempo  2x10 10#  5\" ascend/ descend     L FSU   Runner's step ups 20x alt LE 8\" step Runner's step ups 20x alt LE 8\" step  + ecc fwd/bwd step downs 2x10 8\" step    L LSU          L X-LSU           Modalities                                                                            "

## 2024-05-29 ENCOUNTER — OFFICE VISIT (OUTPATIENT)
Age: 59
End: 2024-05-29
Payer: COMMERCIAL

## 2024-05-29 DIAGNOSIS — M25.562 CHRONIC PAIN OF LEFT KNEE: Primary | ICD-10-CM

## 2024-05-29 DIAGNOSIS — G89.29 CHRONIC PAIN OF LEFT KNEE: Primary | ICD-10-CM

## 2024-05-29 PROCEDURE — 97112 NEUROMUSCULAR REEDUCATION: CPT

## 2024-05-29 PROCEDURE — 97110 THERAPEUTIC EXERCISES: CPT

## 2024-05-29 PROCEDURE — 97530 THERAPEUTIC ACTIVITIES: CPT

## 2024-05-29 NOTE — PROGRESS NOTES
"Daily Note     Today's date: 2024  Patient name: Pam Montalvo  : 1965  MRN: [id number]  Referring provider: Crystal Hoyt*  Dx:   Encounter Diagnosis     ICD-10-CM    1. Chronic pain of left knee  M25.562     G89.29           Start Time: 1630  Stop Time: 1654  Total time in clinic (min): 24 minutes    Subjective: Pt reports continued L medial knee pain, especially with prolonged walking/activity.  States that she had to demonstrate a dance numerous time for  graduation practice.        Objective: See treatment diary below      Assessment: Tolerated treatment well. Patient would benefit from continued PT.  Pt with mild distal quadriceps soreness post-session.  No aggravation of medial knee pain.  Stair negotiation, especially with eccentric step down is challenging due to discomfort and weakness.  1:1 with Son Morales DPT entirety of tx.        Plan: Continue per plan of care.  Progress treatment as tolerated.       Precautions: Hx of CA     Daily Treatment Diary:      Initial Evaluation Date: 24  Auth:  12v or 24  Compliance    Visit Number 1 2 3   Re-Eval       Foto Captured             5/1 5/8 5/13 5/15 5/20 5/22 5/29   Manual          L knee PROM + stretching                              Ther-Ex          Pron quad S      4x30\" strap    Active warm-up Rec bike  8' L3 Rec bike 8' L4 Rec bike 8' L4 Rec bike 8' L4 Rec bike 8' L4 Rec bike 8' L4 Rec bike 8' L4   4-way SLR  Flex only w/ VMO target 2x10 2# Flex only w/ VMO target 2x10 2# 2x10 ea 2#      Std Hip ABD/Ext      15x ea B gtb 2x10 ea B gtb   Std marching       2x10 B gtb   90/90 active HS S + nerve tensioners 15x B     4x30\" strap    LAQ      3x10 5#    L SL leg press  3x10 65# 3x10 65#   2x10 65# 2x10 65#   DL leg press       2x10 135#   Deadlift / rack pull          Lat lunges + contra hip add S 2x10 B 3\" hold         Gastroc prostretch 3x30\" B 5x20\" B + HS S 5x20\" B + HS S       HEP review       " "   Neuro Re-Ed          SLS Balance          Lat Step Down          Ecc fwd step down          Heel slides + strap O-P          Bridges  2x10 feet on bosu T/S on bosu  3x10 2x10 feet on bosu      Bosu fwd lunges       15x alt LE   Bosu split squats          Bosu squats (on flat side) 3x10         Prone TKE          PB wall squats          TB marching          SL RDLs          Dynamic HS S 3x5 steps B    2x10 B LE elevated on chair 3x5 steps B  3x5 steps B 2x5 steps B  10x10\" B foot on chair     Suitcase carry + marching     2x10 step ea B 15# 2x10 step ea B 15#    Ther-Act          Sidestepping 3 laps mirror gtb 4 laps mirror gtb 4 laps mirror gtb  4 laps mirror gtb 4 laps mirror gtb 4 laps mirror gtb   [place] walk 3 laps mirror gtb fwd/bwd 4 laps mirror gtb fwb/bwd 4 laps mirror gtb fwb/bwd  4 laps mirror gtb fwb/bwd 4 laps mirror gtb fwb/bwd 4 laps mirror gtb fwb/bwd   [place] retro walking     10x 7# 10x 7#    STS  3x8 20# chair 3x8 20# chair Tempo  2x10 10#  5\" ascend/ descend      L FSU  Runner's step ups 20x alt LE 8\" step Runner's step ups 20x alt LE 8\" step  + ecc fwd/bwd step downs 2x10 8\" step     L LSU       15x B 9\" step   L X-LSU           Modalities                                                                              "

## 2024-06-03 ENCOUNTER — OFFICE VISIT (OUTPATIENT)
Age: 59
End: 2024-06-03
Payer: COMMERCIAL

## 2024-06-03 DIAGNOSIS — G89.29 CHRONIC PAIN OF LEFT KNEE: Primary | ICD-10-CM

## 2024-06-03 DIAGNOSIS — M25.562 CHRONIC PAIN OF LEFT KNEE: Primary | ICD-10-CM

## 2024-06-03 PROCEDURE — 97112 NEUROMUSCULAR REEDUCATION: CPT

## 2024-06-03 PROCEDURE — 97110 THERAPEUTIC EXERCISES: CPT

## 2024-06-03 PROCEDURE — 97530 THERAPEUTIC ACTIVITIES: CPT

## 2024-06-03 NOTE — PROGRESS NOTES
"Daily Note     Today's date: 6/3/2024  Patient name: Pam Montalvo  : 1965  MRN: [id number]  Referring provider: Crystal Hoyt*  Dx:   Encounter Diagnosis     ICD-10-CM    1. Chronic pain of left knee  M25.562     G89.29           Start Time: 1632  Stop Time: 1656  Total time in clinic (min): 24 minutes    Subjective: Pt reports being able to go for a 16 mile bike ride without L knee pain aggravation, but walking continues to aggravate symptoms.        Objective: See treatment diary below      Assessment: Tolerated treatment well. Patient would benefit from continued PT.  Pt with mild medial L knee pain throughout and post-session.  Discussed meniscus healing properties.  Functionality is gradually improving as strength and motor control improve but pain sensation persists.  1:1 with Son Morales DPT entirety of tx.        Plan: Continue per plan of care.  Progress treatment as tolerated.       Precautions: Hx of CA     Daily Treatment Diary:      Initial Evaluation Date: 24  Auth:  12v or 24  Compliance 5/20 5/22 5/29 6/3   Visit Number 1 2 3 4   Re-Eval        Foto Captured              5/8 5/13 5/15 5/20 5/22 5/29 6/3   Manual          L knee PROM + stretching                              Ther-Ex          Prone quad S     4x30\" strap     Active warm-up Rec bike 8' L4 Rec bike 8' L4 Rec bike 8' L4 Rec bike 8' L4 Rec bike 8' L4 Rec bike 8' L4 Rec bike 8' L4   4-way SLR Flex only w/ VMO target 2x10 2# Flex only w/ VMO target 2x10 2# 2x10 ea 2#       Std Hip ABD/Ext     15x ea B gtb 2x10 ea B gtb    Std marching      2x10 B gtb    90/90 active HS S     4x30\" strap     LAQ     3x10 5#     L SL leg press 3x10 65# 3x10 65#   2x10 65# 2x10 65#    DL leg press      2x10 135#    SL ecc leg press       10x 75# B  10x 85# B   Deadlift / rack pull          Lat lunges + contra hip add S          Gastroc prostretch 5x20\" B + HS S 5x20\" B + HS S        HEP review          Neuro Re-Ed          SLS " "Balance          Lat Step Down          Ecc fwd step down          Heel slides + strap O-P          Bridges 2x10 feet on bosu T/S on bosu  3x10 2x10 feet on bosu       Bosu fwd lunges      15x alt LE Lat lunges 2x10 B   Bosu split squats       2x10 B   Bosu squats (on flat side)          Prone TKE          PB wall squats          SL RDLs       2x10 B   Dynamic HS S 3x5 steps B  3x5 steps B 2x5 steps B  10x10\" B foot on chair      Suitcase carry + marching    2x10 step ea B 15# 2x10 step ea B 15#     Ther-Act          Sidestepping 4 laps mirror gtb 4 laps mirror gtb  4 laps mirror gtb 4 laps mirror gtb 4 laps mirror gtb    Monster walk 4 laps mirror gtb fwb/bwd 4 laps mirror gtb fwb/bwd  4 laps mirror gtb fwb/bwd 4 laps mirror gtb fwb/bwd 4 laps mirror gtb fwb/bwd    Chuy retro walking    10x 7# 10x 7#     STS 3x8 20# chair 3x8 20# chair Tempo  2x10 10#  5\" ascend/ descend    Tempo  2x10 10#  5\" ascend/ descend   L [facility] Runner's step ups 20x alt LE 8\" step Runner's step ups 20x alt LE 8\" step  + ecc fwd/bwd step downs 2x10 8\" step      L LSU      15x B 9\" step    L X-LSU           Modalities                                                                                "

## 2024-06-05 ENCOUNTER — OFFICE VISIT (OUTPATIENT)
Age: 59
End: 2024-06-05
Payer: COMMERCIAL

## 2024-06-05 DIAGNOSIS — G89.29 CHRONIC PAIN OF LEFT KNEE: Primary | ICD-10-CM

## 2024-06-05 DIAGNOSIS — M25.562 CHRONIC PAIN OF LEFT KNEE: Primary | ICD-10-CM

## 2024-06-05 PROCEDURE — 97112 NEUROMUSCULAR REEDUCATION: CPT

## 2024-06-05 PROCEDURE — 97110 THERAPEUTIC EXERCISES: CPT

## 2024-06-05 PROCEDURE — 97530 THERAPEUTIC ACTIVITIES: CPT

## 2024-06-05 NOTE — PROGRESS NOTES
"Daily Note     Today's date: 2024  Patient name: Pam Montalvo  : 1965  MRN: [id number]  Referring provider: Crystal Hoyt*  Dx:   Encounter Diagnosis     ICD-10-CM    1. Chronic pain of left knee  M25.562     G89.29           Start Time: 1630  Stop Time: 1715  Total time in clinic (min): 45 minutes    Subjective: Pt reports continued L knee buckling symptoms with gait.  Pain symptoms persist as well, most discomfort at night.      Objective: See treatment diary below      Assessment: Tolerated treatment well. Patient would benefit from continued PT.  Pt without L knee buckling during tx session.  Utilized various upright and functional training interventions along with eccentric control exercises to facilitate decreased frequency of knee buckling due to motor control and strength gains.  Balance deficit persists - intermittent HHA on parallel bar utilized.  Moderate fatigue noted post-session.  1:1 with Son Morales DPT entirety of tx.        Plan: Continue per plan of care.  Progress treatment as tolerated.       Precautions: Hx of CA     Daily Treatment Diary:      Initial Evaluation Date: 24  Auth:  12v or 24  Compliance 5/20 5/22 5/29 6/3 6/5   Visit Number 1 2 3 4 5   Re-Eval         Foto Captured               5/13 5/15 5/20 5/22 5/29 6/3 6/5   Manual          L knee PROM + stretching                              Ther-Ex          Prone quad S    4x30\" strap      Active warm-up Rec bike 8' L4 Rec bike 8' L4 Rec bike 8' L4 Rec bike 8' L4 Rec bike 8' L4 Rec bike 8' L4 Rec bike 8' L4   4-way SLR Flex only w/ VMO target 2x10 2# 2x10 ea 2#        Std Hip ABD/Ext    15x ea B gtb 2x10 ea B gtb     Std marching     2x10 B gtb     90/90 active HS S    4x30\" strap      LAQ    3x10 5#      L SL leg press 3x10 65#   2x10 65# 2x10 65#     DL leg press     2x10 135#     SL ecc leg press      10x 75# B  10x 85# B 2x10 B 85#   Deadlift / rack pull          Lat lunges + contra hip add S        " "  Gastroc prostretch 5x20\" B + HS S         HEP review          Neuro Re-Ed          Hip open gate       2x10 B 3# aw   Lat Step Down          Ecc fwd step down      2x10 B 4\" 2x10 B 4\"   Heel slides + strap O-P          Bridges T/S on bosu  3x10 2x10 feet on bosu        Bosu fwd lunges     15x alt LE Lat lunges 2x10 B    Bosu split squats      2x10 B    Bosu squats (on flat side)          Prone TKE          PB wall squats          SL RDLs      2x10 B    Dynamic HS S  3x5 steps B 2x5 steps B  10x10\" B foot on chair    2x5 steps B + 5# DBs   Tandem gait foam beam       10 laps   Suitcase carry + marching   2x10 step ea B 15# 2x10 step ea B 15#      Ther-Act          Sidestepping 4 laps mirror gtb  4 laps mirror gtb 4 laps mirror gtb 4 laps mirror gtb     Monster walk 4 laps mirror gtb fwb/bwd  4 laps mirror gtb fwb/bwd 4 laps mirror gtb fwb/bwd 4 laps mirror gtb fwb/bwd     Chuy retro walking   10x 7# 10x 7#      Sled push / pull       5 laps ea + 25#   STS 3x8 20# chair Tempo  2x10 10#  5\" ascend/ descend    Tempo  2x10 10#  5\" ascend/ descend    L [facility] Runner's step ups 20x alt LE 8\" step  + ecc fwd/bwd step downs 2x10 8\" step       L LSU     15x B 9\" step     L X-LSU           Modalities                                                                                  "

## 2024-06-10 ENCOUNTER — TELEPHONE (OUTPATIENT)
Age: 59
End: 2024-06-10

## 2024-06-10 ENCOUNTER — APPOINTMENT (OUTPATIENT)
Age: 59
End: 2024-06-10
Payer: COMMERCIAL

## 2024-06-10 NOTE — TELEPHONE ENCOUNTER
Called patient to inquire as to the reason she canceled today's PT appointment along with her next three appointments. Left a message and awaiting a call back.

## 2024-06-12 ENCOUNTER — APPOINTMENT (OUTPATIENT)
Age: 59
End: 2024-06-12
Payer: COMMERCIAL

## 2024-06-17 ENCOUNTER — APPOINTMENT (OUTPATIENT)
Age: 59
End: 2024-06-17
Payer: COMMERCIAL

## 2024-06-19 ENCOUNTER — APPOINTMENT (OUTPATIENT)
Age: 59
End: 2024-06-19
Payer: COMMERCIAL

## 2024-07-12 ENCOUNTER — PATIENT MESSAGE (OUTPATIENT)
Dept: OBGYN CLINIC | Facility: CLINIC | Age: 59
End: 2024-07-12

## 2024-07-12 ENCOUNTER — TELEPHONE (OUTPATIENT)
Dept: ADMINISTRATIVE | Facility: OTHER | Age: 59
End: 2024-07-12

## 2024-07-12 NOTE — TELEPHONE ENCOUNTER
07/12/24 12:48 PM     Chart reviewed for Hepatitis C  was/were submitted to the patient's insurance.     Felicity Sargent   [facility]

## 2024-07-12 NOTE — TELEPHONE ENCOUNTER
Upon review of the In Basket request we were able to locate, review, and update the patient chart as requested for Hepatitis C .    Any additional questions or concerns should be emailed to the Practice Liaisons via the appropriate education email address, please do not reply via In Basket.    Thank you  Felicity Sargent   [facility]

## 2024-07-12 NOTE — TELEPHONE ENCOUNTER
----- Message from Lilian KATZ sent at 7/12/2024  9:48 AM EDT -----  Regarding: Care Gap Request  07/12/24 9:48 AM    Hello, our patient attached above has had Hepatitis C completed/performed. Please assist in updating the patient chart by pulling the Care Everywhere (CE) document. The date of service is 8/26/15  .     Thank you,  Lilian [facility]

## 2024-07-16 ENCOUNTER — OFFICE VISIT (OUTPATIENT)
Age: 59
End: 2024-07-16

## 2024-07-16 VITALS — SYSTOLIC BLOOD PRESSURE: 126 MMHG | DIASTOLIC BLOOD PRESSURE: 82 MMHG

## 2024-07-16 DIAGNOSIS — Z00.00 WELL ADULT EXAM: Primary | ICD-10-CM

## 2024-07-16 DIAGNOSIS — E78.2 MIXED HYPERLIPIDEMIA: ICD-10-CM

## 2024-07-16 DIAGNOSIS — E03.9 HYPOTHYROIDISM, UNSPECIFIED TYPE: ICD-10-CM

## 2024-07-16 DIAGNOSIS — G89.29 CHRONIC PAIN OF LEFT KNEE: ICD-10-CM

## 2024-07-16 DIAGNOSIS — E66.9 OBESITY (BMI 30.0-34.9): ICD-10-CM

## 2024-07-16 DIAGNOSIS — M25.562 CHRONIC PAIN OF LEFT KNEE: ICD-10-CM

## 2024-07-16 DIAGNOSIS — I10 ESSENTIAL (PRIMARY) HYPERTENSION: ICD-10-CM

## 2024-07-16 DIAGNOSIS — N64.4 BREAST PAIN: ICD-10-CM

## 2024-07-16 PROBLEM — M94.0 COSTOCHONDRITIS: Status: RESOLVED | Noted: 2024-04-17 | Resolved: 2024-07-16

## 2024-07-16 LAB — SL AMB POCT HEMOGLOBIN AIC: 5.4 (ref ?–6.5)

## 2024-07-16 PROCEDURE — 99396 PREV VISIT EST AGE 40-64: CPT | Performed by: NURSE PRACTITIONER

## 2024-07-16 PROCEDURE — 83036 HEMOGLOBIN GLYCOSYLATED A1C: CPT | Performed by: NURSE PRACTITIONER

## 2024-07-16 PROCEDURE — 99214 OFFICE O/P EST MOD 30 MIN: CPT | Performed by: NURSE PRACTITIONER

## 2024-07-16 NOTE — PATIENT INSTRUCTIONS
"Patient Education     Routine physical for adults   The Basics   Written by the doctors and editors at [facility]   What is a physical? -- A physical is a routine visit, or \"check-up,\" with your doctor. You might also hear it called a \"wellness visit\" or \"preventive visit.\"  During each visit, the doctor will:   Ask about your physical and mental health   Ask about your habits, behaviors, and lifestyle   Do an exam   Give you vaccines if needed   Talk to you about any medicines you take   Give advice about your health   Answer your questions  Getting regular check-ups is an important part of taking care of your health. It can help your doctor find and treat any problems you have. But it's also important for preventing health problems.  A routine physical is different from a \"sick visit.\" A sick visit is when you see a doctor because of a health concern or problem. Since physicals are scheduled ahead of time, you can think about what you want to ask the doctor.  How often should I get a physical? -- It depends on your age and health. In general, for people age 21 years and older:   If you are younger than 50 years, you might be able to get a physical every 3 years.   If you are 50 years or older, your doctor might recommend a physical every year.  If you have an ongoing health condition, like diabetes or high blood pressure, your doctor will probably want to see you more often.  What happens during a physical? -- In general, each visit will include:   Physical exam - The doctor or nurse will check your height, weight, heart rate, and blood pressure. They will also look at your eyes and ears. They will ask about how you are feeling and whether you have any symptoms that bother you.   Medicines - It's a good idea to bring a list of all the medicines you take to each doctor visit. Your doctor will talk to you about your medicines and answer any questions. Tell them if you are having any side effects that bother you. You " "should also tell them if you are having trouble paying for any of your medicines.   Habits and behaviors - This includes:   Your diet   Your exercise habits   Whether you smoke, drink alcohol, or use drugs   Whether you are sexually active   Whether you feel safe at home  Your doctor will talk to you about things you can do to improve your health and lower your risk of health problems. They will also offer help and support. For example, if you want to quit smoking, they can give you advice and might prescribe medicines. If you want to improve your diet or get more physical activity, they can help you with this, too.   Lab tests, if needed - The tests you get will depend on your age and situation. For example, your doctor might want to check your:   Cholesterol   Blood sugar   Iron level   Vaccines - The recommended vaccines will depend on your age, health, and what vaccines you already had. Vaccines are very important because they can prevent certain serious or deadly infections.   Discussion of screening - \"Screening\" means checking for diseases or other health problems before they cause symptoms. Your doctor can recommend screening based on your age, risk, and preferences. This might include tests to check for:   Cancer, such as breast, prostate, cervical, ovarian, colorectal, prostate, lung, or skin cancer   Sexually transmitted infections, such as chlamydia and gonorrhea   Mental health conditions like depression and anxiety  Your doctor will talk to you about the different types of screening tests. They can help you decide which screenings to have. They can also explain what the results might mean.   Answering questions - The physical is a good time to ask the doctor or nurse questions about your health. If needed, they can refer you to other doctors or specialists, too.  Adults older than 65 years often need other care, too. As you get older, your doctor will talk to you about:   How to prevent falling at " home   Hearing or vision tests   Memory testing   How to take your medicines safely   Making sure that you have the help and support you need at home  All topics are updated as new evidence becomes available and our peer review process is complete.  This topic retrieved from [employer] on: May 02, 2024.  Topic 200887 Version 1.0  Release: 32.4.3 - C32.122  © 2024 UpToDate, Inc. and/or its affiliates. All rights reserved.  Consumer Information Use and Disclaimer   Disclaimer: This generalized information is a limited summary of diagnosis, treatment, and/or medication information. It is not meant to be comprehensive and should be used as a tool to help the user understand and/or assess potential diagnostic and treatment options. It does NOT include all information about conditions, treatments, medications, side effects, or risks that may apply to a specific patient. It is not intended to be medical advice or a substitute for the medical advice, diagnosis, or treatment of a health care provider based on the health care provider's examination and assessment of a patient's specific and unique circumstances. Patients must speak with a health care provider for complete information about their health, medical questions, and treatment options, including any risks or benefits regarding use of medications. This information does not endorse any treatments or medications as safe, effective, or approved for treating a specific patient. UpToDate, Inc. and its affiliates disclaim any warranty or liability relating to this information or the use thereof.The use of this information is governed by the Terms of Use, available at https://www.woltersSimfinituwer.com/en/know/clinical-effectiveness-terms. 2024© UpToDate, Inc. and its affiliates and/or licensors. All rights reserved.  Copyright   © 2024 UpToDate, Inc. and/or its affiliates. All rights reserved.

## 2024-07-16 NOTE — ASSESSMENT & PLAN NOTE
Advice and education were given regarding nutrition, aerobic exercises, weight bearing exercises, cardiovascular risk reduction, fall risk reduction, and age appropriate supplements. The patient was counseled regarding instructions for management, risk factor reductions, prognosis, risks and benefits of treatment options, patient and family education, and importance of compliance with treatment.     Yes, she can have them as often as every 4 hours

## 2024-07-16 NOTE — ASSESSMENT & PLAN NOTE
Pt has completed PT, and is following with [facility]. She did have a steroid injection which did help with sx.

## 2024-07-16 NOTE — ASSESSMENT & PLAN NOTE
Pt reporting right inner upper quad pain (and potential lump felt), and left axilla pain. Given hx of breast cancer will order US at this time.

## 2024-07-16 NOTE — PROGRESS NOTES
Adult Annual Physical  Name: Pam Montalvo      : 1965      MRN: [id number]  Encounter Provider: [facility]  Encounter Date: 2024   Encounter department: [facility] IN PARTNERSHIP WITH [facility]    Assessment & Plan   1. Well adult exam  Assessment & Plan:  Advice and education were given regarding nutrition, aerobic exercises, weight bearing exercises, cardiovascular risk reduction, fall risk reduction, and age appropriate supplements. The patient was counseled regarding instructions for management, risk factor reductions, prognosis, risks and benefits of treatment options, patient and family education, and importance of compliance with treatment.    Orders:  -     POCT hemoglobin A1c  -     Comprehensive metabolic panel; Future  -     CBC and differential; Future  -     TSH, 3rd generation with Free T4 reflex; Future  -     Lipid Panel with Direct LDL reflex; Future  2. Obesity (BMI 30.0-34.9)  Assessment & Plan:  Goal to consume 500 to 1,000 fewer calories per day for a 1-2 lbs weight loss per week. Increase exercise to 30 min, 5 times a week as tolerated for a goal of 150 mins a week. Calorie tracking apps such as myfitness pal can be helpful for keeping track of calorie intake. Decrease simple carbohydrates (white bread, pasta, white rice), and increasing vegetables, fruit, and protein.  Increase water.    Orders:  -     Comprehensive metabolic panel; Future  -     CBC and differential; Future  -     TSH, 3rd generation with Free T4 reflex; Future  -     Lipid Panel with Direct LDL reflex; Future  3. Mixed hyperlipidemia  -     Comprehensive metabolic panel; Future  -     CBC and differential; Future  -     TSH, 3rd generation with Free T4 reflex; Future  -     Lipid Panel with Direct LDL reflex; Future  4. Essential (primary) hypertension  Assessment & Plan:  Blood pressure well-controlled at this time on amlodipine 5 mg.  Discussed low salt  diet, increasing exercise to 30 mins 3-4x a week. Check home BP and record for next visit. BP goal <140/90. Discussed ER precautions for chest pain, stroke precautions, or BP of 180/120 or greater (hypertensive crisis), change in LOC or worsening symptoms. Call with new or worsening symptoms.   If you have numbness, tingling, weakness, or severe headache with elevated BP go to the ED.    Orders:  -     Comprehensive metabolic panel; Future  -     CBC and differential; Future  -     TSH, 3rd generation with Free T4 reflex; Future  -     Lipid Panel with Direct LDL reflex; Future  5. Breast pain  Assessment & Plan:  Pt reporting right inner upper quad pain (and potential lump felt), and left axilla pain. Given hx of breast cancer will order US at this time.   Orders:  -     US breast axilla right; Future; Expected date: 07/16/2024  -     US Breast Axilla Left; Future; Expected date: 07/16/2024  6. Chronic pain of left knee  Assessment & Plan:  Pt has completed PT, and is following with [facility]. She did have a steroid injection which did help with sx.   7. Hypothyroidism, unspecified type  Assessment & Plan:  Patient currently taking Synthroid 25 mg. No concerns at this time. Will recheck labs.    Immunizations and preventive care screenings were discussed with patient today. Appropriate education was printed on patient's after visit summary.    Counseling:  Alcohol/drug use: discussed moderation in alcohol intake, the recommendations for healthy alcohol use, and avoidance of illicit drug use.  Dental Health: discussed importance of regular tooth brushing, flossing, and dental visits.  Injury prevention: discussed safety/seat belts, safety helmets, smoke detectors, carbon dioxide detectors, and smoking near bedding or upholstery.  Sexual health: discussed sexually transmitted diseases, partner selection, use of condoms, avoidance of unintended pregnancy, and contraceptive alternatives.  Exercise: the importance of regular  exercise/physical activity was discussed. Recommend exercise 3-5 times per week for at least 30 minutes.       Depression Screening and Follow-up Plan: Patient was screened for depression during today's encounter. They screened negative with a PHQ-2 score of 0.        History of Present Illness     Adult Annual Physical:  Patient presents for annual physical.     Diet and Physical Activity:  - Diet/Nutrition: well balanced diet.  - Exercise: walking.    Depression Screening:  - PHQ-2 Score: 0    General Health:  - Sleep: sleeps well.  - Hearing: normal hearing bilateral ears.  - Vision: most recent eye exam < 1 year ago.  - Dental: regular dental visits.    /GYN Health:  - Follows with GYN: yes.     Review of Systems   Constitutional: Negative.  Negative for chills and fever.   HENT: Negative.  Negative for ear pain and sore throat.    Eyes:  Negative for pain and visual disturbance.   Respiratory: Negative.  Negative for cough and shortness of breath.    Cardiovascular: Negative.  Negative for chest pain and palpitations.   Gastrointestinal: Negative.  Negative for abdominal pain and vomiting.   Genitourinary: Negative.  Negative for dysuria and hematuria.   Musculoskeletal: Negative.  Negative for arthralgias and back pain.   Skin: Negative.  Negative for color change and rash.   Neurological: Negative.  Negative for seizures and syncope.   All other systems reviewed and are negative.        Objective     /82     Physical Exam  Vitals and nursing note reviewed.   Constitutional:       General: She is not in acute distress.     Appearance: She is well-developed.   HENT:      Head: Normocephalic and atraumatic.      Right Ear: Tympanic membrane, ear canal and external ear normal.      Left Ear: Tympanic membrane, ear canal and external ear normal.      Nose: Nose normal.      Mouth/Throat:      Mouth: Mucous membranes are moist.      Pharynx: Oropharynx is clear.   Eyes:      General:         Right eye: No  discharge.         Left eye: No discharge.      Conjunctiva/sclera: Conjunctivae normal.      Pupils: Pupils are equal, round, and reactive to light.   Cardiovascular:      Rate and Rhythm: Normal rate and regular rhythm.      Heart sounds: No murmur heard.  Pulmonary:      Effort: Pulmonary effort is normal. No respiratory distress.      Breath sounds: Normal breath sounds.   Abdominal:      General: Bowel sounds are normal.      Palpations: Abdomen is soft.      Tenderness: There is no abdominal tenderness.   Musculoskeletal:         General: No swelling.      Cervical back: Neck supple.      Right lower leg: No edema.      Left lower leg: No edema.   Skin:     General: Skin is warm and dry.      Capillary Refill: Capillary refill takes less than 2 seconds.   Neurological:      Mental Status: She is alert and oriented to person, place, and time.   Psychiatric:         Mood and Affect: Mood normal.         Behavior: Behavior normal.         Thought Content: Thought content normal.         Judgment: Judgment normal.       Administrative Statements   I have spent a total time of 40 minutes in caring for this patient on the day of the visit/encounter including Instructions for management, Patient and family education, Importance of tx compliance, Risk factor reductions, Impressions, Documenting in the medical record, and Reviewing / ordering tests, medicine, procedures  .

## 2024-07-17 ENCOUNTER — TELEPHONE (OUTPATIENT)
Dept: OBGYN CLINIC | Facility: CLINIC | Age: 59
End: 2024-07-17

## 2024-07-17 NOTE — PATIENT COMMUNICATION
Patient was called and left a detailed voice message in regards to rescheduling her yearly appointment. Advised patient to call the office to reschedule.

## 2024-07-17 NOTE — TELEPHONE ENCOUNTER
Patient was called and left a detailed voice message in regards to rescheduling her yearly appointment. Advised patient to call the office.

## 2024-07-18 DIAGNOSIS — N64.4 BREAST PAIN: Primary | ICD-10-CM

## 2024-07-20 DIAGNOSIS — E03.9 HYPOTHYROIDISM, UNSPECIFIED TYPE: ICD-10-CM

## 2024-07-20 RX ORDER — LEVOTHYROXINE SODIUM 0.03 MG/1
25 TABLET ORAL DAILY
Qty: 90 TABLET | Refills: 1 | Status: SHIPPED | OUTPATIENT
Start: 2024-07-20

## 2024-07-21 LAB
ALBUMIN SERPL-MCNC: 4.7 G/DL (ref 3.6–5.1)
ALBUMIN/GLOB SERPL: 1.9 (CALC) (ref 1–2.5)
ALP SERPL-CCNC: 54 U/L (ref 37–153)
ALT SERPL-CCNC: 20 U/L (ref 6–29)
AST SERPL-CCNC: 18 U/L (ref 10–35)
BASOPHILS # BLD AUTO: 33 CELLS/UL (ref 0–200)
BASOPHILS NFR BLD AUTO: 0.6 %
BILIRUB SERPL-MCNC: 0.7 MG/DL (ref 0.2–1.2)
BUN SERPL-MCNC: 15 MG/DL (ref 7–25)
BUN/CREAT SERPL: NORMAL (CALC) (ref 6–22)
CALCIUM SERPL-MCNC: 9.5 MG/DL (ref 8.6–10.4)
CHLORIDE SERPL-SCNC: 105 MMOL/L (ref 98–110)
CHOLEST SERPL-MCNC: 191 MG/DL
CHOLEST/HDLC SERPL: 2.9 (CALC)
CO2 SERPL-SCNC: 30 MMOL/L (ref 20–32)
CREAT SERPL-MCNC: 0.77 MG/DL (ref 0.5–1.05)
EOSINOPHIL # BLD AUTO: 72 CELLS/UL (ref 15–500)
EOSINOPHIL NFR BLD AUTO: 1.3 %
ERYTHROCYTE [DISTWIDTH] IN BLOOD BY AUTOMATED COUNT: 12.9 % (ref 11–15)
GFR/BSA.PRED SERPLBLD CYS-BASED-ARV: 88 ML/MIN/1.73M2
GLOBULIN SER CALC-MCNC: 2.5 G/DL (CALC) (ref 1.9–3.7)
GLUCOSE SERPL-MCNC: 92 MG/DL (ref 65–99)
HCT VFR BLD AUTO: 45.1 % (ref 35–45)
HDLC SERPL-MCNC: 66 MG/DL
HGB BLD-MCNC: 15.1 G/DL (ref 11.7–15.5)
LDLC SERPL CALC-MCNC: 110 MG/DL (CALC)
LYMPHOCYTES # BLD AUTO: 1865 CELLS/UL (ref 850–3900)
LYMPHOCYTES NFR BLD AUTO: 33.9 %
MCH RBC QN AUTO: 31.3 PG (ref 27–33)
MCHC RBC AUTO-ENTMCNC: 33.5 G/DL (ref 32–36)
MCV RBC AUTO: 93.6 FL (ref 80–100)
MONOCYTES # BLD AUTO: 506 CELLS/UL (ref 200–950)
MONOCYTES NFR BLD AUTO: 9.2 %
NEUTROPHILS # BLD AUTO: 3025 CELLS/UL (ref 1500–7800)
NEUTROPHILS NFR BLD AUTO: 55 %
NONHDLC SERPL-MCNC: 125 MG/DL (CALC)
PLATELET # BLD AUTO: 202 THOUSAND/UL (ref 140–400)
PMV BLD REES-ECKER: 12.2 FL (ref 7.5–12.5)
POTASSIUM SERPL-SCNC: 4.3 MMOL/L (ref 3.5–5.3)
PROT SERPL-MCNC: 7.2 G/DL (ref 6.1–8.1)
RBC # BLD AUTO: 4.82 MILLION/UL (ref 3.8–5.1)
SODIUM SERPL-SCNC: 141 MMOL/L (ref 135–146)
TRIGL SERPL-MCNC: 68 MG/DL
TSH SERPL-ACNC: 1.65 MIU/L (ref 0.4–4.5)
WBC # BLD AUTO: 5.5 THOUSAND/UL (ref 3.8–10.8)

## 2024-07-23 ENCOUNTER — TELEPHONE (OUTPATIENT)
Dept: OBGYN CLINIC | Facility: CLINIC | Age: 59
End: 2024-07-23

## 2024-07-23 NOTE — TELEPHONE ENCOUNTER
Left message on machine for pt to call office re: see if we can get appt set for [facility] office in August.       Ok to offer 12 or 12:30 on 8/5/24.    Also in [facility] 8/23 and 8/26 can make something work one of those 2 days.

## 2024-07-25 ENCOUNTER — TELEPHONE (OUTPATIENT)
Dept: OBGYN CLINIC | Facility: CLINIC | Age: 59
End: 2024-07-25

## 2024-07-25 DIAGNOSIS — I10 ESSENTIAL (PRIMARY) HYPERTENSION: ICD-10-CM

## 2024-07-25 NOTE — TELEPHONE ENCOUNTER
----- Message from Kelsie MURPHY sent at 7/25/2024 10:10 AM EDT -----  Regarding: Appt Needed in [facility]  Can you call patient and offer her to be seen with Gavin over lunch on 8/5?  Pt has been cancelled multiple times and Gavin is trying to squeeze her in.

## 2024-07-26 ENCOUNTER — TELEPHONE (OUTPATIENT)
Dept: OBGYN CLINIC | Facility: CLINIC | Age: 59
End: 2024-07-26

## 2024-07-26 RX ORDER — AMLODIPINE BESYLATE 5 MG/1
5 TABLET ORAL DAILY
Qty: 90 TABLET | Refills: 1 | Status: SHIPPED | OUTPATIENT
Start: 2024-07-26

## 2024-07-26 NOTE — TELEPHONE ENCOUNTER
Attempted to schedule patient several times. Explained circumstances in [place] currently.     Pt requested she would not like to be seen by us. No more attempts.

## 2024-08-07 DIAGNOSIS — J45.20 MILD INTERMITTENT ASTHMA, UNSPECIFIED WHETHER COMPLICATED: ICD-10-CM

## 2024-08-08 RX ORDER — MONTELUKAST SODIUM 10 MG/1
10 TABLET ORAL
Qty: 90 TABLET | Refills: 1 | Status: SHIPPED | OUTPATIENT
Start: 2024-08-08

## 2024-08-15 PROBLEM — Z00.00 WELL ADULT EXAM: Status: RESOLVED | Noted: 2023-06-21 | Resolved: 2024-08-15

## 2024-08-20 ENCOUNTER — OFFICE VISIT (OUTPATIENT)
Age: 59
End: 2024-08-20

## 2024-08-20 DIAGNOSIS — R07.81 RIB PAIN: Primary | ICD-10-CM

## 2024-08-20 DIAGNOSIS — M25.50 MULTIPLE JOINT PAIN: ICD-10-CM

## 2024-08-20 PROCEDURE — 99214 OFFICE O/P EST MOD 30 MIN: CPT | Performed by: NURSE PRACTITIONER

## 2024-08-20 RX ORDER — METHYLPREDNISOLONE 4 MG
TABLET, DOSE PACK ORAL
Qty: 21 EACH | Refills: 0 | Status: SHIPPED | OUTPATIENT
Start: 2024-08-20

## 2024-08-20 NOTE — PROGRESS NOTES
Ambulatory Visit  Name: Pam Montalvo      : 1965      MRN: [id number]  Encounter Provider: [facility]  Encounter Date: 2024   Encounter department: [facility] IN PARTNERSHIP WITH [facility]    Assessment & Plan   1. Rib pain  Assessment & Plan:  Pain with palpitation. Will order labs to r/o underlying causes. Medrol dose pk given for possible inflammation and pain. Discussed SE and use. No palpable masses noted, area of concern at base of neck is sternal end of clavicle.   Orders:  -     methylPREDNISolone 4 MG tablet therapy pack; Use as directed on package  -     rheumatoid arthritis dianostic panel; Future  -     C-reactive protein; Future  -     rheumatoid arthritis dianostic panel  -     C-reactive protein  2. Multiple joint pain  Assessment & Plan:  Labs ordered to r/o underlying causes.   Orders:  -     VISHAL Screen w/ Reflex to Titer/Pattern; Future  -     rheumatoid arthritis dianostic panel; Future  -     C-reactive protein; Future  -     VISHAL Screen w/ Reflex to Titer/Pattern  -     rheumatoid arthritis dianostic panel  -     C-reactive protein     History of Present Illness     Pt here today to f/u on painful bumps in her axiliary area, base of the neck, and ribs. Pt's reporting painful bumps at the base of the clavicle, and on rib area. Pt states the areas are tender to the touch. Recent US on axillary reported lumps were negative. Pt denies injury or trauma to the area.  She does report multijoint pain in knees, hips, and elbows bilaterally. She denies fevers, body aches, swelling, or redness.          Review of Systems   Constitutional: Negative.  Negative for chills and fever.   HENT: Negative.  Negative for ear pain and sore throat.    Eyes:  Negative for pain and visual disturbance.   Respiratory: Negative.  Negative for cough and shortness of breath.    Cardiovascular:  Negative for chest pain and palpitations.   Gastrointestinal:   "Negative for abdominal pain and vomiting.   Genitourinary:  Negative for dysuria and hematuria.   Musculoskeletal:  Negative for arthralgias and back pain.        Pain in ribs, clavicle and axilla.    Skin:  Negative for color change and rash.   Neurological:  Negative for seizures and syncope.   All other systems reviewed and are negative.      Objective     /82 (BP Location: Right arm, Patient Position: Sitting, Cuff Size: Standard)   Pulse 71   Ht 5' 6\" (1.676 m)   Wt 98.4 kg (217 lb)   SpO2 99%   BMI 35.02 kg/m²     Physical Exam  Vitals and nursing note reviewed.   Constitutional:       General: She is not in acute distress.     Appearance: She is well-developed.   HENT:      Head: Normocephalic and atraumatic.   Eyes:      Conjunctiva/sclera: Conjunctivae normal.   Cardiovascular:      Rate and Rhythm: Normal rate and regular rhythm.      Heart sounds: No murmur heard.  Pulmonary:      Effort: Pulmonary effort is normal. No respiratory distress.      Breath sounds: Normal breath sounds.   Abdominal:      Palpations: Abdomen is soft.      Tenderness: There is no abdominal tenderness.   Musculoskeletal:         General: No swelling.        Arms:       Cervical back: Neck supple.      Comments: Pt reports subjective pain at sternal end of clavicle bilaterally. Pain on ribs in the axilla area with palpitation. No palpable masses noted.    Skin:     General: Skin is warm and dry.      Capillary Refill: Capillary refill takes less than 2 seconds.   Neurological:      Mental Status: She is alert.   Psychiatric:         Mood and Affect: Mood normal.       Administrative Statements   I have spent a total time of 30 minutes in caring for this patient on the day of the visit/encounter including Risks and benefits of tx options, Instructions for management, Patient and family education, Importance of tx compliance, Counseling / Coordination of care, Documenting in the medical record, and Reviewing / ordering " tests, medicine, procedures  .

## 2024-08-21 VITALS
OXYGEN SATURATION: 99 % | WEIGHT: 217 LBS | DIASTOLIC BLOOD PRESSURE: 82 MMHG | SYSTOLIC BLOOD PRESSURE: 130 MMHG | HEIGHT: 66 IN | HEART RATE: 71 BPM | BODY MASS INDEX: 34.87 KG/M2

## 2024-08-21 PROBLEM — R07.81 RIB PAIN: Status: ACTIVE | Noted: 2024-08-21

## 2024-08-21 PROBLEM — M25.50 MULTIPLE JOINT PAIN: Status: ACTIVE | Noted: 2024-08-21

## 2024-08-21 NOTE — PATIENT INSTRUCTIONS
Patient Education     Joint Pain   About this topic   Joint pain is sometimes called arthralgia. You have pain where one or more bones are connected.       What are the causes?   Joint pain may be caused by:  Injury  Infection  Health problems like an immune disorder, or other illness  Problems with cartilage, ligaments, or tendons  What can make this more likely to happen?   Older age  Doing the same motion over and over with a joint  Being overweight  Injuries  What are the main signs?   You may have mild or very bad pain. The pain may be constant or it may come and go. You may have trouble moving the joint that hurts. The pain may burn, stab, or throb. It may be sharp or dull. Your joint may feel stiff, numb, or tingly. It may be hard to move or put weight on a joint if the pain is bad.  How does the doctor diagnose this health problem?   The doctor will ask you questions about your history and do an exam. The doctor will check your joints with care and may order:  Lab tests  X-rays  How does the doctor treat this health problem?   Your care is based on what is causing your pain. The doctor will also treat it based on how bad your pain is and where your pain is found on your body. The doctor may suggest you:  Limit your activity.  Do stretching exercises.  Your doctor may want you to start an exercise program. Some kinds of exercise, like swimming, may help ease pain. It can keep your muscles strong and helps you maintain a healthy weight.  Place an ice pack or a bag of frozen peas wrapped in a towel over the painful part. Never put ice right on the skin. Do not leave the ice on more than 10 to 15 minutes at a time.  Use heat. Put a heating pad on your painful part for no more than 20 minutes at a time. Never go to sleep with a heating pad on as this can cause burns.  What drugs may be needed?   The doctor may order drugs to:  Help with pain and swelling  Your doctor may instruct you to take:  Drugs like  ibuprofen or naproxen for pain. These are all nonsteroidal anti-inflammatory drugs (NSAIDS). Do not take more than one type of these drugs at the same time.  Drugs for pain such as acetaminophen.  The doctor may give you a shot of an anti-inflammatory drug called a corticosteroid. This will help with swelling.  Helpful tips   Stay active and work out to keep your muscles strong and flexible.  Keep a healthy weight. Being heavy puts more stress on your joints. This makes them more likely to hurt.  Warm up slowly and stretch before you work out. Use good ways to train, such as slowly adding to how far you run. Do not work out if you are overly tired. Take extra care if working out in cold weather.  Last Reviewed Date   2020-10-12  Consumer Information Use and Disclaimer   This generalized information is a limited summary of diagnosis, treatment, and/or medication information. It is not meant to be comprehensive and should be used as a tool to help the user understand and/or assess potential diagnostic and treatment options. It does NOT include all information about conditions, treatments, medications, side effects, or risks that may apply to a specific patient. It is not intended to be medical advice or a substitute for the medical advice, diagnosis, or treatment of a health care provider based on the health care provider's examination and assessment of a patient’s specific and unique circumstances. Patients must speak with a health care provider for complete information about their health, medical questions, and treatment options, including any risks or benefits regarding use of medications. This information does not endorse any treatments or medications as safe, effective, or approved for treating a specific patient. UpToDate, Inc. and its affiliates disclaim any warranty or liability relating to this information or the use thereof. The use of this information is governed by the Terms of Use, available at  https://www.woltersSilicon[employer] Technologiesuwer.com/en/know/clinical-effectiveness-terms   Copyright   Copyright © 2024 UpToDate, Inc. and its affiliates and/or licensors. All rights reserved.

## 2024-08-21 NOTE — ASSESSMENT & PLAN NOTE
Pain with palpitation. Will order labs to r/o underlying causes. Medrol dose pk given for possible inflammation and pain. Discussed SE and use. No palpable masses noted, area of concern at base of neck is sternal end of clavicle.

## 2024-08-25 LAB — CRP SERPL-MCNC: <3 MG/L

## 2024-08-29 LAB
ANA SER QL IF: NEGATIVE
CCP IGG SERPL-ACNC: <16 UNITS
CRP SERPL-MCNC: <3 MG/L
ENA SS-A AB SER IA-ACNC: <1 AI
ENA SS-B AB SER IA-ACNC: <1 AI
RF IGA SER-ACNC: <5 U
RF IGG SER-ACNC: <5 U
RF IGM SER-ACNC: <5 U

## 2024-09-10 ENCOUNTER — RA CDI HCC (OUTPATIENT)
Dept: OTHER | Facility: HOSPITAL | Age: 59
End: 2024-09-10

## 2024-09-10 PROBLEM — U07.1 COVID-19: Status: RESOLVED | Noted: 2023-12-06 | Resolved: 2024-09-10

## 2024-09-17 ENCOUNTER — APPOINTMENT (OUTPATIENT)
Age: 59
End: 2024-09-17
Payer: COMMERCIAL

## 2024-09-17 ENCOUNTER — OFFICE VISIT (OUTPATIENT)
Age: 59
End: 2024-09-17

## 2024-09-17 VITALS
HEIGHT: 66 IN | OXYGEN SATURATION: 95 % | BODY MASS INDEX: 34.68 KG/M2 | WEIGHT: 215.8 LBS | TEMPERATURE: 98.7 F | SYSTOLIC BLOOD PRESSURE: 130 MMHG | DIASTOLIC BLOOD PRESSURE: 78 MMHG | HEART RATE: 49 BPM

## 2024-09-17 DIAGNOSIS — R07.81 RIB PAIN: ICD-10-CM

## 2024-09-17 DIAGNOSIS — I10 ESSENTIAL (PRIMARY) HYPERTENSION: Primary | ICD-10-CM

## 2024-09-17 PROCEDURE — 71111 X-RAY EXAM RIBS/CHEST4/> VWS: CPT

## 2024-09-17 PROCEDURE — 99214 OFFICE O/P EST MOD 30 MIN: CPT | Performed by: NURSE PRACTITIONER

## 2024-09-17 RX ORDER — CYCLOBENZAPRINE HCL 10 MG
10 TABLET ORAL 3 TIMES DAILY PRN
Start: 2024-09-17

## 2024-09-17 NOTE — PROGRESS NOTES
"Ambulatory Visit  Name: Pam Montalvo      : 1965      MRN: [id number]  Encounter Provider: [facility]  Encounter Date: 2024   Encounter department: [facility] IN PARTNERSHIP WITH [facility]    Assessment & Plan  Essential (primary) hypertension  BP well controlled with current medication.   Discussed low salt diet, increasing exercise to 30 mins 3-4x a week. Check home BP and record for next visit. BP goal <140/90. Discussed ER precautions for chest pain, stroke precautions, or BP of 180/120 or greater (hypertensive crisis), change in LOC or worsening symptoms. Call with new or worsening symptoms.   If you have numbness, tingling, weakness, or severe headache with elevated BP go to the ED.           Rib pain  Pt with on going rib pain for aprox 6 months. Pain occurs when bony areas are palpated by pt and when she is laying down on ribs. She reports occasional \"striking\" pain under left axilla towards ribs. Pain is reproducible. She denies injury to area, but will obtain X-ray to r/o underlying causes. Flexeril given to help with pain at bedtime. Advised on SE and use. Advised may cause drowsiness so use at bedtime only.  Orders:    XR ribs bilateral 4+ vw w pa chest; Future    cyclobenzaprine (FLEXERIL) 10 mg tablet; Take 1 tablet (10 mg total) by mouth 3 (three) times a day as needed for muscle spasms       History of Present Illness     Pt here to f/u on ongoing rib pain, for the past 6 months. Pain is bilateral but right side she experiences \"striking pain\" on the left side. The area is TTP, especially under the axilla. She has been doing some stretches and notes aching while doing the stretches. Pt reports the pain is worse when she touches the ribs and when she is in bed. She reports shooting rib pain on left come on randomly. Pain is described as an aching pain. She does report pain happens occasionally when moving.   Pain on left side can be " "a 9/10 (when she experiences the striking pain), which can last 1-2 mins, but is tender afterwards. At it's best pain is a 1/10 when she is not thinking about it or palpating the area. She denies chest pain, SOB, leg swelling. US and recent mammogram was negative, but pt does have a hx of breast cancer.         History obtained from : patient  Review of Systems   Constitutional: Negative.  Negative for chills and fever.   HENT: Negative.  Negative for ear pain and sore throat.    Eyes:  Negative for pain and visual disturbance.   Respiratory: Negative.  Negative for cough and shortness of breath.    Cardiovascular: Negative.  Negative for chest pain and palpitations.   Gastrointestinal: Negative.  Negative for abdominal pain and vomiting.   Genitourinary: Negative.  Negative for dysuria and hematuria.   Musculoskeletal:  Negative for arthralgias and back pain.        Rib pain bilaterally.    Skin:  Negative for color change and rash.   Neurological:  Negative for seizures and syncope.   All other systems reviewed and are negative.          Objective     /78 (BP Location: Right arm, Patient Position: Sitting, Cuff Size: Standard)   Pulse (!) 49   Temp 98.7 °F (37.1 °C)   Ht 5' 6\" (1.676 m)   Wt 97.9 kg (215 lb 12.8 oz)   SpO2 95%   BMI 34.83 kg/m²     Physical Exam  Vitals and nursing note reviewed.   Constitutional:       General: She is not in acute distress.     Appearance: She is well-developed.   HENT:      Head: Normocephalic and atraumatic.      Right Ear: Tympanic membrane, ear canal and external ear normal.      Left Ear: Tympanic membrane, ear canal and external ear normal.      Nose: Nose normal.      Mouth/Throat:      Mouth: Mucous membranes are moist.      Pharynx: Oropharynx is clear.   Eyes:      General:         Right eye: No discharge.         Left eye: No discharge.      Conjunctiva/sclera: Conjunctivae normal.      Pupils: Pupils are equal, round, and reactive to light. "   Cardiovascular:      Rate and Rhythm: Normal rate and regular rhythm.      Heart sounds: No murmur heard.  Pulmonary:      Effort: Pulmonary effort is normal. No respiratory distress.      Breath sounds: Normal breath sounds.   Abdominal:      General: Bowel sounds are normal.      Palpations: Abdomen is soft.      Tenderness: There is no abdominal tenderness.   Musculoskeletal:         General: No swelling.      Cervical back: Neck supple.      Right lower leg: No edema.      Left lower leg: No edema.      Comments: Rib pain with palpation.    Skin:     General: Skin is warm and dry.      Capillary Refill: Capillary refill takes less than 2 seconds.   Neurological:      Mental Status: She is alert and oriented to person, place, and time.   Psychiatric:         Mood and Affect: Mood normal.         Behavior: Behavior normal.         Thought Content: Thought content normal.         Judgment: Judgment normal.       Administrative Statements   I have spent a total time of 20 minutes in caring for this patient on the day of the visit/encounter including Risks and benefits of tx options, Importance of tx compliance, Risk factor reductions, Documenting in the medical record, Reviewing / ordering tests, medicine, procedures  , and Obtaining or reviewing history  .

## 2024-09-17 NOTE — ASSESSMENT & PLAN NOTE
"Pt with on going rib pain for aprox 6 months. Pain occurs when bony areas are palpated by pt and when she is laying down on ribs. She reports occasional \"striking\" pain under left axilla towards ribs. Pain is reproducible. She denies injury to area, but will obtain X-ray to r/o underlying causes. Flexeril given to help with pain at bedtime. Advised on SE and use. Advised may cause drowsiness so use at bedtime only.  Orders:    XR ribs bilateral 4+ vw w pa chest; Future    cyclobenzaprine (FLEXERIL) 10 mg tablet; Take 1 tablet (10 mg total) by mouth 3 (three) times a day as needed for muscle spasms    "

## 2024-09-17 NOTE — ASSESSMENT & PLAN NOTE
BP well controlled with current medication.   Discussed low salt diet, increasing exercise to 30 mins 3-4x a week. Check home BP and record for next visit. BP goal <140/90. Discussed ER precautions for chest pain, stroke precautions, or BP of 180/120 or greater (hypertensive crisis), change in LOC or worsening symptoms. Call with new or worsening symptoms.   If you have numbness, tingling, weakness, or severe headache with elevated BP go to the ED.

## 2024-10-22 ENCOUNTER — OFFICE VISIT (OUTPATIENT)
Age: 59
End: 2024-10-22

## 2024-10-22 VITALS
BODY MASS INDEX: 33.75 KG/M2 | TEMPERATURE: 97.2 F | SYSTOLIC BLOOD PRESSURE: 142 MMHG | HEIGHT: 66 IN | DIASTOLIC BLOOD PRESSURE: 80 MMHG | WEIGHT: 210 LBS | HEART RATE: 81 BPM | OXYGEN SATURATION: 98 %

## 2024-10-22 DIAGNOSIS — J21.9 BRONCHIOLITIS: Primary | ICD-10-CM

## 2024-10-22 LAB
SARS-COV-2 AG UPPER RESP QL IA: NEGATIVE
SL AMB POCT RAPID FLU A: NORMAL
SL AMB POCT RAPID FLU B: NORMAL
VALID CONTROL: NORMAL

## 2024-10-22 PROCEDURE — 87811 SARS-COV-2 COVID19 W/OPTIC: CPT | Performed by: STUDENT IN AN ORGANIZED HEALTH CARE EDUCATION/TRAINING PROGRAM

## 2024-10-22 PROCEDURE — AMOXICILLIN 875MG 20 AMOXICILLIN 875MG 20: Performed by: STUDENT IN AN ORGANIZED HEALTH CARE EDUCATION/TRAINING PROGRAM

## 2024-10-22 PROCEDURE — 99213 OFFICE O/P EST LOW 20 MIN: CPT | Performed by: NURSE PRACTITIONER

## 2024-10-22 PROCEDURE — 87804 INFLUENZA ASSAY W/OPTIC: CPT | Performed by: STUDENT IN AN ORGANIZED HEALTH CARE EDUCATION/TRAINING PROGRAM

## 2024-10-22 PROCEDURE — MEDROL 4MG MEDROL 4MG: Performed by: STUDENT IN AN ORGANIZED HEALTH CARE EDUCATION/TRAINING PROGRAM

## 2024-10-22 PROCEDURE — MUCINEX DM 12 HR MUCINEX DM 12 HR: Performed by: STUDENT IN AN ORGANIZED HEALTH CARE EDUCATION/TRAINING PROGRAM

## 2024-10-22 RX ORDER — AMOXICILLIN 875 MG/1
875 TABLET, COATED ORAL 2 TIMES DAILY
Start: 2024-10-22 | End: 2024-11-01

## 2024-10-22 RX ORDER — METHYLPREDNISOLONE 4 MG/1
TABLET ORAL
Start: 2024-10-22

## 2024-10-22 NOTE — PATIENT INSTRUCTIONS
"Patient Education     Acute bronchitis in adults   The Basics   Written by the doctors and editors at [facility]   What is bronchitis? -- This is an infection that causes a cough. It happens when the tubes that carry air into the lungs, called the \"bronchi,\" get infected (figure 1).  Usually, bronchitis happens after a person gets a cold or the flu. The viruses that cause the cold or flu infect the bronchi and irritate them. Antibiotics do not help bronchitis.  Bronchitis can also happen when a person gets an infection called \"whooping cough,\" but this is much less common. Whooping cough is caused by bacteria that can infect the bronchi. Most people get vaccines to prevent whooping cough, but the vaccine doesn't always work. Your doctor will be able to tell if you have whooping cough by doing an exam and listening to your cough.  This article is about \"acute\" bronchitis. This is different from \"chronic\" bronchitis, which is a lung disease that most often affects people who smoke.  What are the symptoms of bronchitis? -- The most common symptoms are:   A cough that can last up to a few weeks   Coughing up mucus that is clear, yellow, or green - Green mucus does not always mean that you have a bacterial infection.  You might also have other cold or flu symptoms, like a stuffy nose, sore throat, or headache. People with bronchitis do not usually get a fever.  Will I need tests? -- Most people with bronchitis do not need a test. But if your doctor or nurse is not sure what is causing your cough, they might do tests. For example, they might order a chest X-ray. Or if they think that you might have COVID-19, they will test you for the virus that causes the infection.  How is bronchitis treated? -- Bronchitis almost always goes away on its own. But the cough can take up to 3 weeks to get better, and sometimes even longer.  Doctors do not usually treat bronchitis with antibiotic medicines. That's because bronchitis is usually " caused by a virus, and antibiotics kill bacteria, not viruses. Also, antibiotics can actually cause other problems.  To feel better, you can treat your cold and flu symptoms. You can:   Get lots of rest, and drink plenty of liquids.   Drink hot tea.   Suck on cough drops or hard candy.   Take over-the-counter cough and cold medicines.   Breath in warm, moist air, such as in the shower, over a kettle, or from a humidifier.   Take a pain-relieving medicine if you have cold or flu symptoms like headache, muscle aches, or joint pain.  Avoid smoking or being around others who smoke. This can make your cough worse.  How can I keep from getting bronchitis again? -- You can reduce your chance of getting bronchitis again by keeping the germs that cause bronchitis out of your body. One of the best ways to do this is to wash your hands often with soap and water. If there is no sink nearby, you can use a hand gel with alcohol in it to clean your hands.  How can I keep from spreading germs? -- In addition to washing your hands often, cover your mouth with your elbow when you sneeze or cough. Using your elbow keeps you from getting germs on your hands. If you use a tissue, throw the tissue away and wash your hands.  When should I call the doctor? -- Call for advice if you have:   A fever higher than 100.4°F (38°C), or chills   Chest pain when you cough, trouble breathing, or coughing up blood   A barking cough that makes it hard to talk   Cough and weight loss that you cannot explain   Symptoms that are not getting better after 3 weeks  All topics are updated as new evidence becomes available and our peer review process is complete.  This topic retrieved from [employer] on: May 16, 2024.  Topic 27745 Version 17.0  Release: 32.4.3 - C32.135  © 2024 UpToDate, Inc. and/or its affiliates. All rights reserved.  figure 1: Normal lungs     The lungs sit in the chest, inside the ribcage. They are covered with a thin membrane called the  "\"pleura.\" The windpipe, or trachea, branches into 2 smaller airways called the left and right \"bronchi.\" The space between the lungs is called the \"mediastinum.\" Lymph nodes are located within and around the lungs and mediastinum.  Graphic 17996 Version 14.0  Consumer Information Use and Disclaimer   Disclaimer: This generalized information is a limited summary of diagnosis, treatment, and/or medication information. It is not meant to be comprehensive and should be used as a tool to help the user understand and/or assess potential diagnostic and treatment options. It does NOT include all information about conditions, treatments, medications, side effects, or risks that may apply to a specific patient. It is not intended to be medical advice or a substitute for the medical advice, diagnosis, or treatment of a health care provider based on the health care provider's examination and assessment of a patient's specific and unique circumstances. Patients must speak with a health care provider for complete information about their health, medical questions, and treatment options, including any risks or benefits regarding use of medications. This information does not endorse any treatments or medications as safe, effective, or approved for treating a specific patient. UpToDate, Inc. and its affiliates disclaim any warranty or liability relating to this information or the use thereof.The use of this information is governed by the Terms of Use, available at https://www.w[employer]resuwer.com/en/know/clinical-effectiveness-terms. 2024© UpToDate, Inc. and its affiliates and/or licensors. All rights reserved.  Copyright   © 2024 UpToDate, Inc. and/or its affiliates. All rights reserved.    "

## 2024-10-22 NOTE — PROGRESS NOTES
Ambulatory Visit  Name: Pam Montalvo      : 1965      MRN: [id number]  Encounter Provider: NGOC Bates  Encounter Date: 10/22/2024   Encounter department: [facility] IN PARTNERSHIP WITH [facility]    Assessment & Plan  Bronchiolitis  Amoxicillin, medrol dose pack and Mucinex DM given in office. Advised pt of SE and use. COVID/FLU negative today.   Increase fluid intake and get plenty of rest.       Please start medication given in office today.       If you have nasal congestions, post nasal drip, sinus pressure, runny nose you may try the following:        Clearing your sinuses in a nice steamy shower or in bathroom filled with steamy air.     Nasal saline rinses every 1-2 hours while awake may also help decrease nasal congestion, drainage.     You may try Mucinex D 12 hour version.  1/2 to 1 tablet one to two times a day as needed for nasal congestion, runny nose, post nasal drip, or cough.     If you have sore / scratch / irritated throat, you may try the following:          Warm salt water gargles every 1-2 hours while awake, throat lozenges, Tylenol and/or ibuprofen.       Please note that a cough is not necessarily a bad thing.  It is the body's way of protecting the airways.  If a cough is keeping you from sleeping at night, you may use cough suppressant such as Delsym Cough Syrup, NyQuil, Robitussin DM.       Most upper respiratory symptoms start to improve after 7-10 days but may take a few weeks to completely resolve.     You may also use Ibuprofen or Acetaminophen containing product for symptom relief.   Follow up in 1 week if your symptoms persist or worsen.    Please call the office if you have any questions.   The patient verbalized understanding of treatment plan.     Orders:    amoxicillin (AMOXIL) 875 mg tablet; Take 1 tablet (875 mg total) by mouth 2 (two) times a day for 10 days    methylPREDNISolone 4 MG tablet therapy pack; Use as  "directed on package    dextromethorphan-guaifenesin (MUCINEX DM)  MG per 12 hr tablet; Take 1 tablet by mouth every 12 (twelve) hours       History of Present Illness     Pt here today with concerns of sore throat, cough, wheezing, chest tightness, for over two weeks. She has been treating her sx with Mucinex, flonase and zyrtec but sx are worsening. She denies fever, N/V/D, body aches or chills.     Cough  This is a new problem. The current episode started 1 to 4 weeks ago. The problem has been gradually worsening. Associated symptoms include ear congestion, nasal congestion, postnasal drip, a sore throat, shortness of breath and wheezing. Pertinent negatives include no chest pain, chills, ear pain, fever, heartburn, hemoptysis, myalgias, rash, rhinorrhea, sweats or weight loss. Treatments tried: Flonase, Zyrtec, Mucinex. The treatment provided no relief.       History obtained from : patient  Review of Systems   Constitutional:  Negative for chills, fever and weight loss.   HENT:  Positive for congestion, postnasal drip and sore throat. Negative for ear discharge, ear pain, rhinorrhea, sinus pressure, sinus pain and trouble swallowing.    Respiratory:  Positive for cough, chest tightness, shortness of breath and wheezing. Negative for apnea, hemoptysis, choking and stridor.    Cardiovascular: Negative.  Negative for chest pain.   Gastrointestinal: Negative.  Negative for heartburn.   Genitourinary: Negative.    Musculoskeletal: Negative.  Negative for myalgias.   Skin:  Negative for rash.   Neurological: Negative.            Objective     /80 (BP Location: Right arm, Patient Position: Sitting, Cuff Size: Large)   Pulse 81   Temp (!) 97.2 °F (36.2 °C)   Ht 5' 6\" (1.676 m)   Wt 95.3 kg (210 lb)   SpO2 98%   BMI 33.89 kg/m²     Physical Exam  Vitals and nursing note reviewed.   Constitutional:       General: She is not in acute distress.     Appearance: Normal appearance. She is normal weight. She " is ill-appearing. She is not toxic-appearing or diaphoretic.   HENT:      Head: Normocephalic and atraumatic.      Right Ear: Tympanic membrane, ear canal and external ear normal.      Left Ear: Tympanic membrane, ear canal and external ear normal.      Nose: Nose normal. No congestion or rhinorrhea.      Mouth/Throat:      Mouth: Mucous membranes are moist.      Pharynx: No oropharyngeal exudate or posterior oropharyngeal erythema.   Eyes:      General:         Right eye: No discharge.         Left eye: No discharge.      Conjunctiva/sclera: Conjunctivae normal.   Cardiovascular:      Rate and Rhythm: Normal rate and regular rhythm.      Heart sounds: Normal heart sounds.   Pulmonary:      Effort: Pulmonary effort is normal. No respiratory distress.      Breath sounds: No stridor. Wheezing and rhonchi present. No rales.   Chest:      Chest wall: No tenderness.   Abdominal:      General: Bowel sounds are normal.      Palpations: Abdomen is soft.   Musculoskeletal:         General: Normal range of motion.      Cervical back: Normal range of motion.      Right lower leg: No edema.      Left lower leg: No edema.   Skin:     General: Skin is warm and dry.   Neurological:      Mental Status: She is alert and oriented to person, place, and time.   Psychiatric:         Mood and Affect: Mood normal.         Behavior: Behavior normal.         Thought Content: Thought content normal.         Judgment: Judgment normal.       Administrative Statements   I have spent a total time of 26 minutes in caring for this patient on the day of the visit/encounter including Risks and benefits of tx options, Patient and family education, Documenting in the medical record, and Reviewing / ordering tests, medicine, procedures  .

## 2024-11-15 ENCOUNTER — APPOINTMENT (OUTPATIENT)
Age: 59
End: 2024-11-15
Payer: COMMERCIAL

## 2024-11-15 ENCOUNTER — OFFICE VISIT (OUTPATIENT)
Age: 59
End: 2024-11-15

## 2024-11-15 VITALS
WEIGHT: 214 LBS | BODY MASS INDEX: 34.39 KG/M2 | SYSTOLIC BLOOD PRESSURE: 128 MMHG | HEIGHT: 66 IN | DIASTOLIC BLOOD PRESSURE: 76 MMHG | OXYGEN SATURATION: 97 % | TEMPERATURE: 98.7 F | HEART RATE: 72 BPM

## 2024-11-15 DIAGNOSIS — J45.20 MILD INTERMITTENT ASTHMA, UNSPECIFIED WHETHER COMPLICATED: Primary | ICD-10-CM

## 2024-11-15 DIAGNOSIS — J01.10 ACUTE NON-RECURRENT FRONTAL SINUSITIS: Primary | ICD-10-CM

## 2024-11-15 DIAGNOSIS — J45.20 MILD INTERMITTENT ASTHMA, UNSPECIFIED WHETHER COMPLICATED: ICD-10-CM

## 2024-11-15 DIAGNOSIS — J01.10 ACUTE NON-RECURRENT FRONTAL SINUSITIS: ICD-10-CM

## 2024-11-15 DIAGNOSIS — K12.1 MOUTH ULCER: ICD-10-CM

## 2024-11-15 LAB
BASOPHILS # BLD AUTO: 0.04 THOUSANDS/ÂΜL (ref 0–0.1)
BASOPHILS NFR BLD AUTO: 1 % (ref 0–1)
EOSINOPHIL # BLD AUTO: 0.08 THOUSAND/ÂΜL (ref 0–0.61)
EOSINOPHIL NFR BLD AUTO: 1 % (ref 0–6)
ERYTHROCYTE [DISTWIDTH] IN BLOOD BY AUTOMATED COUNT: 13.2 % (ref 11.6–15.1)
HCT VFR BLD AUTO: 47.3 % (ref 34.8–46.1)
HGB BLD-MCNC: 15.5 G/DL (ref 11.5–15.4)
IMM GRANULOCYTES # BLD AUTO: 0.02 THOUSAND/UL (ref 0–0.2)
IMM GRANULOCYTES NFR BLD AUTO: 0 % (ref 0–2)
LYMPHOCYTES # BLD AUTO: 1.86 THOUSANDS/ÂΜL (ref 0.6–4.47)
LYMPHOCYTES NFR BLD AUTO: 26 % (ref 14–44)
MCH RBC QN AUTO: 32.1 PG (ref 26.8–34.3)
MCHC RBC AUTO-ENTMCNC: 32.8 G/DL (ref 31.4–37.4)
MCV RBC AUTO: 98 FL (ref 82–98)
MONOCYTES # BLD AUTO: 0.64 THOUSAND/ÂΜL (ref 0.17–1.22)
MONOCYTES NFR BLD AUTO: 9 % (ref 4–12)
NEUTROPHILS # BLD AUTO: 4.66 THOUSANDS/ÂΜL (ref 1.85–7.62)
NEUTS SEG NFR BLD AUTO: 63 % (ref 43–75)
NRBC BLD AUTO-RTO: 0 /100 WBCS
PLATELET # BLD AUTO: 208 THOUSANDS/UL (ref 149–390)
PMV BLD AUTO: 12.5 FL (ref 8.9–12.7)
RBC # BLD AUTO: 4.83 MILLION/UL (ref 3.81–5.12)
WBC # BLD AUTO: 7.3 THOUSAND/UL (ref 4.31–10.16)

## 2024-11-15 PROCEDURE — 99213 OFFICE O/P EST LOW 20 MIN: CPT | Performed by: NURSE PRACTITIONER

## 2024-11-15 PROCEDURE — 85025 COMPLETE CBC W/AUTO DIFF WBC: CPT

## 2024-11-15 PROCEDURE — 71046 X-RAY EXAM CHEST 2 VIEWS: CPT

## 2024-11-15 PROCEDURE — 36415 COLL VENOUS BLD VENIPUNCTURE: CPT

## 2024-11-15 RX ORDER — BENZONATATE 200 MG/1
200 CAPSULE ORAL 3 TIMES DAILY PRN
Qty: 20 CAPSULE | Refills: 0 | Status: SHIPPED | OUTPATIENT
Start: 2024-11-15

## 2024-11-15 RX ORDER — DOXYCYCLINE HYCLATE 100 MG
100 TABLET ORAL 2 TIMES DAILY
Qty: 14 TABLET | Refills: 0 | Status: SHIPPED | OUTPATIENT
Start: 2024-11-15 | End: 2024-11-22

## 2024-11-15 RX ORDER — DIPHENHYDRAMINE HYDROCHLORIDE AND LIDOCAINE HYDROCHLORIDE AND ALUMINUM HYDROXIDE AND MAGNESIUM HYDRO
10 KIT EVERY 4 HOURS PRN
Qty: 119 ML | Refills: 0 | Status: SHIPPED | OUTPATIENT
Start: 2024-11-15

## 2024-11-15 RX ORDER — METHYLPREDNISOLONE 4 MG/1
TABLET ORAL
Start: 2024-11-15

## 2024-11-15 RX ORDER — BECLOMETHASONE DIPROPIONATE HFA 80 UG/1
2 AEROSOL, METERED RESPIRATORY (INHALATION) 2 TIMES DAILY
Qty: 10.6 G | Refills: 1 | Status: SHIPPED | OUTPATIENT
Start: 2024-11-15

## 2024-11-15 RX ORDER — ALBUTEROL SULFATE 90 UG/1
2 INHALANT RESPIRATORY (INHALATION) EVERY 6 HOURS PRN
Qty: 18 G | Refills: 0 | Status: SHIPPED | OUTPATIENT
Start: 2024-11-15

## 2024-11-15 NOTE — PROGRESS NOTES
Name: Pam Montalvo      : 1965      MRN: [id number]  Encounter Provider: NGOC Bates  Encounter Date: 11/15/2024   Encounter department: [facility] IN PARTNERSHIP WITH [facility]  :  Assessment & Plan  Acute non-recurrent frontal sinusitis  Will switch to Doxycycline at this time. CXR ordered given ongoing reported sx of wheezing, and pt reported exposure to community acquired pneumonia. QVAR and Albuterol reordered today. Advised on sx which warrant more emergent f/u.   Orders:    benzonatate (TESSALON) 200 MG capsule; Take 1 capsule (200 mg total) by mouth 3 (three) times a day as needed for cough    doxycycline hyclate (VIBRA-TABS) 100 mg tablet; Take 1 tablet (100 mg total) by mouth 2 (two) times a day for 7 days    methylPREDNISolone 4 MG tablet therapy pack; Use as directed on package    CBC and differential    Mild intermittent asthma, unspecified whether complicated  Qvar and albuterol re-ordered.   Orders:    albuterol (PROVENTIL HFA,VENTOLIN HFA) 90 mcg/act inhaler; Inhale 2 puffs every 6 (six) hours as needed for wheezing or shortness of breath    beclomethasone (Qvar RediHaler) 80 MCG/ACT inhaler; Inhale 2 puffs 2 (two) times a day    XR chest pa and lateral; Future    benzonatate (TESSALON) 200 MG capsule; Take 1 capsule (200 mg total) by mouth 3 (three) times a day as needed for cough    methylPREDNISolone 4 MG tablet therapy pack; Use as directed on package    CBC and differential    Mouth ulcer  Advised on use of medication. Advised avoiding spicy, acidic foods.   Orders:    diphenhydramine, lidocaine, Al/Mg hydroxide, simethicone (Magic Mouthwash) SUSP; Swish and spit 10 mL every 4 (four) hours as needed for mouth pain or discomfort    CBC and differential           History of Present Illness     Pt here today with concerns of sore throat, nasal congestion, mucus, sinus pressure, wheezing. Pt was seen for similar sx on 10/22, and was tx  "with amoxicillin. Pt reports her sx did not fully improve. Pt reports she was using her Qvar as needed and albuterol as needed. She is also reporting mouth ulcers which have developed on the tip of her tongue and are painful. Pt denies fevers, SOB, difficulty breathing, body aches, difficulty/painful swallowing.    Sinus Problem  This is a new problem. The current episode started 1 to 4 weeks ago. The problem is unchanged. There has been no fever. Associated symptoms include congestion, coughing, sinus pressure and a sore throat. Pertinent negatives include no chills, diaphoresis, ear pain, headaches, hoarse voice, neck pain, shortness of breath, sneezing or swollen glands. Treatments tried: Amox, Quvar, albuterol. The treatment provided mild relief.     Review of Systems   Constitutional:  Positive for fatigue. Negative for chills, diaphoresis and fever.   HENT:  Positive for congestion, postnasal drip, sinus pressure, sinus pain and sore throat. Negative for ear pain, hoarse voice, sneezing and trouble swallowing.         Ulcer on tip of tongue    Respiratory:  Positive for cough and wheezing. Negative for apnea, choking, chest tightness, shortness of breath and stridor.    Gastrointestinal: Negative.    Musculoskeletal: Negative.  Negative for neck pain.   Neurological: Negative.  Negative for headaches.          Objective   /76 (BP Location: Left arm, Patient Position: Sitting, Cuff Size: Large)   Pulse 72   Temp 98.7 °F (37.1 °C)   Ht 5' 6\" (1.676 m)   Wt 97.1 kg (214 lb)   SpO2 97%   BMI 34.54 kg/m²      Physical Exam  Vitals and nursing note reviewed.   Constitutional:       General: She is not in acute distress.     Appearance: Normal appearance. She is normal weight. She is not ill-appearing.   HENT:      Head: Normocephalic and atraumatic.      Right Ear: Tympanic membrane, ear canal and external ear normal.      Left Ear: Tympanic membrane, ear canal and external ear normal.      Nose: " Congestion present.      Mouth/Throat:      Mouth: Mucous membranes are moist.      Pharynx: Oropharynx is clear. Posterior oropharyngeal erythema present. No oropharyngeal exudate.   Eyes:      General:         Right eye: No discharge.         Left eye: No discharge.      Conjunctiva/sclera: Conjunctivae normal.   Cardiovascular:      Rate and Rhythm: Normal rate and regular rhythm.      Heart sounds: Normal heart sounds.   Pulmonary:      Effort: Pulmonary effort is normal. No respiratory distress.      Breath sounds: Normal breath sounds. No stridor. No wheezing, rhonchi or rales.   Chest:      Chest wall: No tenderness.   Abdominal:      General: Bowel sounds are normal.      Palpations: Abdomen is soft.      Tenderness: There is no right CVA tenderness or left CVA tenderness.   Musculoskeletal:         General: Normal range of motion.      Cervical back: Normal range of motion.      Right lower leg: No edema.      Left lower leg: No edema.   Lymphadenopathy:      Cervical: No cervical adenopathy.   Skin:     General: Skin is warm and dry.   Neurological:      Mental Status: She is alert and oriented to person, place, and time.   Psychiatric:         Mood and Affect: Mood normal.         Behavior: Behavior normal.         Thought Content: Thought content normal.         Judgment: Judgment normal.       Administrative Statements   I have spent a total time of 22 minutes in caring for this patient on the day of the visit/encounter including Instructions for management, Patient and family education, Importance of tx compliance, Documenting in the medical record, Reviewing / ordering tests, medicine, procedures  , and Obtaining or reviewing history  .

## 2024-11-15 NOTE — ASSESSMENT & PLAN NOTE
Qvar and albuterol re-ordered.   Orders:    albuterol (PROVENTIL HFA,VENTOLIN HFA) 90 mcg/act inhaler; Inhale 2 puffs every 6 (six) hours as needed for wheezing or shortness of breath    beclomethasone (Qvar RediHaler) 80 MCG/ACT inhaler; Inhale 2 puffs 2 (two) times a day    XR chest pa and lateral; Future    benzonatate (TESSALON) 200 MG capsule; Take 1 capsule (200 mg total) by mouth 3 (three) times a day as needed for cough    methylPREDNISolone 4 MG tablet therapy pack; Use as directed on package    CBC and differential

## 2024-11-18 ENCOUNTER — RESULTS FOLLOW-UP (OUTPATIENT)
Dept: FAMILY MEDICINE CLINIC | Facility: CLINIC | Age: 59
End: 2024-11-18

## 2024-11-18 NOTE — PATIENT INSTRUCTIONS
Patient Education     Sinus Headache Discharge Instructions   About this topic   A sinus headache is caused by infected sinuses. Sinuses are small air spaces in the head behind the nose, eyes, and cheeks. They lead into the nose. They allow fluid, called mucus, to drain. They can swell due to an allergy or an infection. When they swell, fluid gets trapped and it can get infected. This causes pressure in your head. If the pressure is very bad you will feel pain. If you move suddenly or lean forward the pain often gets worse. The pain is dull and throbs. You may feel it in the top of your face. You may also ache and have a yellow-green drainage from your nose. Your doctor may order drugs to stop the infection and to help with swelling.           What care is needed at home?   Ask your doctor what you need to do when you go home. Make sure you ask questions if you do not understand what the doctor says. This way you will know what you need to do.  Use a salt water or saline rinse in your nose. Talk to your doctor about how often you should do this. A saline spray can also be used.  Hold a warm cloth to your sinuses for a few minutes. Then, hold a cold cloth to your sinuses for 30 seconds. Repeat a few times a day.  Take a hot bath or shower. Breathe in steam from a bowl of hot water or hot shower. This moist air can help the headache.  Drink 6 to 8 glasses of water each day.  Avoid beer, wine, and mixed drinks (alcohol). This can make the nose and sinuses swell.  Do not smoke and avoid areas where people smoke.  What follow-up care is needed?   Your doctor may ask you to make visits to the office to check on your progress. Be sure to keep these visits.  What drugs may be needed?   The doctor may order drugs to:  Relieve headache  Help with pain and swelling  Treat an allergy  Moisten your nose  Stop an infection  Will physical activity be limited?   You may be more comfortable if you do not lean forward or lie down.  Try to sleep with your head and shoulders propped on a few pillows. Talk to your doctor about the right amount of activity for you.  What can be done to prevent this health problem?   Avoid fumes, smoke, dust, and other allergens. These can bring on your headache.  Look for treatment if you have a cold or an infection. This can keep you from getting sinusitis.  When do I need to call the doctor?   Signs of infection. These include a fever of 100.4°F (38°C) or higher, chills, very bad sore throat, ear or sinus pain, cough, more sputum or change in color of sputum.  Sinus headache lasts for more than a week  The drugs your doctor gave you do not work  Swelling or redness around one or both eyes  Stiff neck  Teach Back: Helping You Understand   The Teach Back Method helps you understand the information we are giving you. After you talk with the staff, tell them in your own words what you learned. This helps to make sure the staff has described each thing clearly. It also helps to explain things that may have been confusing. Before going home, make sure you can do these:  I can tell you about my condition.  I can tell you what may help ease my pain.  I can tell you what I will do if my headache lasts for more than a week.  Last Reviewed Date   2021-11-03  Consumer Information Use and Disclaimer   This generalized information is a limited summary of diagnosis, treatment, and/or medication information. It is not meant to be comprehensive and should be used as a tool to help the user understand and/or assess potential diagnostic and treatment options. It does NOT include all information about conditions, treatments, medications, side effects, or risks that may apply to a specific patient. It is not intended to be medical advice or a substitute for the medical advice, diagnosis, or treatment of a health care provider based on the health care provider's examination and assessment of a patient’s specific and unique  circumstances. Patients must speak with a health care provider for complete information about their health, medical questions, and treatment options, including any risks or benefits regarding use of medications. This information does not endorse any treatments or medications as safe, effective, or approved for treating a specific patient. [employer] and its affiliates disclaim any warranty or liability relating to this information or the use thereof. The use of this information is governed by the Terms of Use, available at https://www.w[employer]Bass Manageruwer.com/en/know/clinical-effectiveness-terms   Copyright   Copyright © 2024 [employer] and its affiliates and/or licensors. All rights reserved.

## 2024-11-29 ENCOUNTER — TELEPHONE (OUTPATIENT)
Dept: FAMILY MEDICINE CLINIC | Facility: CLINIC | Age: 59
End: 2024-11-29

## 2024-11-29 NOTE — TELEPHONE ENCOUNTER
We received forms on patient from [facility] regarding a left total knee replacement surgery and an inquiry to hold anticoagulant. Forms placed on your desk for review.    ELAYNE Ramirez

## 2024-12-17 ENCOUNTER — CONSULT (OUTPATIENT)
Age: 59
End: 2024-12-17

## 2024-12-17 VITALS
DIASTOLIC BLOOD PRESSURE: 72 MMHG | BODY MASS INDEX: 34.49 KG/M2 | OXYGEN SATURATION: 99 % | HEIGHT: 66 IN | HEART RATE: 62 BPM | WEIGHT: 214.6 LBS | TEMPERATURE: 98.4 F | SYSTOLIC BLOOD PRESSURE: 130 MMHG

## 2024-12-17 DIAGNOSIS — G89.29 CHRONIC PAIN OF LEFT KNEE: ICD-10-CM

## 2024-12-17 DIAGNOSIS — M25.562 CHRONIC PAIN OF LEFT KNEE: ICD-10-CM

## 2024-12-17 DIAGNOSIS — Z01.818 PREOP EXAMINATION: Primary | ICD-10-CM

## 2024-12-17 PROCEDURE — 99214 OFFICE O/P EST MOD 30 MIN: CPT | Performed by: NURSE PRACTITIONER

## 2024-12-17 NOTE — PROGRESS NOTES
Name: Pam Montalvo      : 1965      MRN: [id number]  Encounter Provider: [facility]  Encounter Date: 2024   Encounter department: [facility] IN PARTNERSHIP WITH [facility]  :  Assessment & Plan  Preop examination  Patient is medically optimized (cleared) for the planned procedure.  According to the Detsky Cardiac Risk Index, the patient has a low chance of cardiac complications during this non-cardiac surgery. Physical exam was performed.  Patient has been medically optimized for the procedure.  The surgeon and anesthesiologist are to further explain the risks and benefits of the procedure to the patient.    Further testing/evaluation is not required.  Postop concerns: no        Chronic pain of left knee  Pt scheduled for TKR with Dr. Carrera.               History of Present Illness     Presurgical Evaluation    Subjective:     Patient ID: Pam Monatlvo is a 59 y.o. female.    No chief complaint on file.      @[facility]@    The following portions of the patient's history were reviewed and updated as appropriate: allergies, current medications, past family history, past medical history, past social history, past surgical history, and problem list.    Procedure date: 01/10/2025    Surgeon:  Dr. Carrera  Planned procedure:  Total left knee replacement  Diagnosis for procedure:  Left knee pain, arthritis left knee    Prior anesthesia: Yes   General; Complications:  difficulty waking up, she reports waking up in the procedure.   MAC; Complications:  None / Tolerated well  Local; Complications:  None / Tolerated well    CAD History: None   NOTE: Patient should see Cardiology if time available before surgery, and if appropriate.    Pulmonary History: None    Renal history: None    Diabetes History:  None     Neurological History: None     On Immunosuppressant meds/biologics: No      @[facility]@     Current Outpatient Medications:  albuterol (PROVENTIL  HFA,VENTOLIN HFA) 90 mcg/act inhaler, Inhale 2 puffs every 6 (six) hours as needed for wheezing or shortness of breath, Disp: 18 g, Rfl: 0  amLODIPine (NORVASC) 5 mg tablet, Take 1 tablet (5 mg total) by mouth daily, Disp: 90 tablet, Rfl: 1  beclomethasone (Qvar RediHaler) 80 MCG/ACT inhaler, Inhale 2 puffs 2 (two) times a day, Disp: 10.6 g, Rfl: 1  cyclobenzaprine (FLEXERIL) 10 mg tablet, Take 1 tablet (10 mg total) by mouth 3 (three) times a day as needed for muscle spasms, Disp: , Rfl:   diphenhydramine, lidocaine, Al/Mg hydroxide, simethicone (Magic Mouthwash) SUSP, Swish and spit 10 mL every 4 (four) hours as needed for mouth pain or discomfort, Disp: 119 mL, Rfl: 0  levothyroxine 25 mcg tablet, TAKE 1 TABLET BY MOUTH EVERY DAY, Disp: 90 tablet, Rfl: 1  methylPREDNISolone 4 MG tablet therapy pack, Use as directed on package, Disp: , Rfl:   montelukast (SINGULAIR) 10 mg tablet, Take 1 tablet (10 mg total) by mouth daily at bedtime, Disp: 90 tablet, Rfl: 1    No current facility-administered medications for this visit.      Allergies on file:   Covid-19 mrna vacc (moderna), Dust mite extract, Hydrocodone-acetaminophen, Influenza virus vaccine, Medical tape, Molds & smuts, and Morphine    Patient Active Problem List:     Asthma     Colon polyps     Epistaxis     Essential (primary) hypertension     Fatty liver disease, nonalcoholic     Hyperlipidemia     Hypothyroid     Impaired fasting glucose     Invasive ductal carcinoma of breast, female, left (HCC)     Obesity (BMI 30.0-34.9)     Compound nevus of shoulder, left     Basal cell carcinoma     Left knee pain     Breast pain     Rib pain     Multiple joint pain       Past Medical History:  Most my life: Allergic  After breaking my right leg in 2008: Arthritis  Most of my life: Asthma  February 2019: Cancer (HCC)      Comment:  Left Breast  12/06/2023: COVID-19  For the past couple of decades: Disease of thyroid gland  1980: Heart murmur      Comment:  Benign  Heart Murmur  Most of my adult life: Hypertension  2008: Obesity  Frequent when I was a child: Otitis media  Frequent when I was a child & a young adult: Urinary tract infection  High School Teenager: Visual impairment      Comment:  Nearsighted    Past Surgical History:  May 2019: BREAST SURGERY      Comment:  Partial masectomy of left breast  May 2008: FRACTURE SURGERY      Comment:  Right Leg  May 2019: LYMPH NODE BIOPSY      Comment:  Due to Breast Cancer - Left    Review of patient's family history indicates:  Problem: Alcohol abuse      Relation: Mother          Age of Onset: (Not Specified)  Problem: Mental illness      Relation: Mother          Age of Onset: (Not Specified)          Comment: Manic-Depressive  Problem: Coronary artery disease      Relation: Mother          Age of Onset: (Not Specified)  Problem: Hypertension      Relation: Mother          Age of Onset: (Not Specified)  Problem: Heart disease      Relation: Mother          Age of Onset: (Not Specified)          Comment: Missing a valve  Problem: Diabetes      Relation: Mother          Age of Onset: (Not Specified)          Comment: Type 2  Problem: COPD      Relation: Mother          Age of Onset: (Not Specified)  Problem: Cancer      Relation: Mother          Age of Onset: (Not Specified)          Comment: Skin, lungs, pancreas  Problem: Hearing loss      Relation: Mother          Age of Onset: (Not Specified)  Problem: Anxiety disorder      Relation: Mother          Age of Onset: (Not Specified)  Problem: Mental illness      Relation: Father          Age of Onset: (Not Specified)          Comment: Paranoid Schizophrenia  Problem: Depression      Relation: Father          Age of Onset: (Not Specified)  Problem: Hypertension      Relation: Father          Age of Onset: (Not Specified)  Problem: Heart disease      Relation: Father          Age of Onset: (Not Specified)          Comment: Pacemaker  Problem: Cancer      Relation: Father           Age of Onset: (Not Specified)          Comment: Bone  Problem: Anxiety disorder      Relation: Father          Age of Onset: (Not Specified)  Problem: Schizophrenia      Relation: Father          Age of Onset: (Not Specified)  Problem: Suicide Attempts      Relation: Father          Age of Onset: (Not Specified)  Problem: Alcohol abuse      Relation: Maternal Grandfather          Age of Onset: (Not Specified)  Problem: Cancer      Relation: Maternal Grandfather          Age of Onset: (Not Specified)          Comment: Brain  Problem: Hypertension      Relation: Maternal Grandmother          Age of Onset: (Not Specified)  Problem: Colon cancer      Relation: Maternal Grandmother          Age of Onset: (Not Specified)  Problem: Cancer      Relation: Maternal Grandmother          Age of Onset: (Not Specified)          Comment: Colon  Problem: Glaucoma      Relation: Maternal Grandmother          Age of Onset: (Not Specified)  Problem: Heart disease      Relation: Paternal Grandfather          Age of Onset: (Not Specified)          Comment: Heart Attack  Problem: Alcohol abuse      Relation: Brother          Age of Onset: (Not Specified)  Problem: Substance Abuse      Relation: Brother          Age of Onset: (Not Specified)  Problem: Hypertension      Relation: Brother          Age of Onset: (Not Specified)  Problem: Heart disease      Relation: Brother          Age of Onset: (Not Specified)          Comment: Slow heart rate  Problem: Diabetes      Relation: Brother          Age of Onset: (Not Specified)          Comment: Type 2  Problem: Alcohol abuse      Relation: Son          Age of Onset: (Not Specified)  Problem: Mental illness      Relation: Son          Age of Onset: (Not Specified)          Comment: Anxiety/Depression  Problem: Hypertension      Relation: Son          Age of Onset: (Not Specified)  Problem: Heart disease      Relation: Son          Age of Onset: (Not Specified)          Comment: POTS  Problem:  Autoimmune disease      Relation: Son          Age of Onset: (Not Specified)          Comment: POTS  Problem: Alcohol abuse      Relation: Maternal Uncle          Age of Onset: (Not Specified)  Problem: Mental illness      Relation: Sister          Age of Onset: (Not Specified)          Comment: Anxiety/Depression  Problem: Asthma      Relation: Sister          Age of Onset: (Not Specified)  Problem: Anxiety disorder      Relation: Sister          Age of Onset: (Not Specified)  Problem: Heart disease      Relation: Paternal Uncle          Age of Onset: (Not Specified)          Comment: Heart Attack  Problem: Asthma      Relation: Daughter          Age of Onset: (Not Specified)  Problem: Autoimmune disease      Relation: Cousin          Age of Onset: (Not Specified)          Comment: Fibromyalga  Problem: Breast cancer      Relation: Cousin          Age of Onset: (Not Specified)  Problem: Breast cancer      Relation: Maternal Aunt          Age of Onset: (Not Specified)  Problem: Breast cancer      Relation: Paternal Aunt          Age of Onset: (Not Specified)      Social History    Tobacco Use      Smoking status: Never        Passive exposure: Yes      Smokeless tobacco: Never      Tobacco comments: Only smoked a cigarette here or there when I was a teenager & young adult.    Vaping Use      Vaping status: Never Used    Alcohol use: Yes      Alcohol/week: 2.0 standard drinks of alcohol      Types: 1 Glasses of wine, 1 Standard drinks or equivalent per week      Comment: Only have a drink or two on the weekends.    Drug use: Not Currently      Types: Marijuana      Comment: Just smoked marijuana a few times in my teenage years.      Objective:    There were no vitals filed for this visit.    [unfilled]     Preop labs/testing available and reviewed: no               EKG no    Echo no    Stress test/cath no    PFT/[place] no    Functional capacity: Climb stairs                        4 Mets   Pick the highest level  "patient can comfortably perform   4 mets or greater for surgery    RCRI  High Risk surgery?         1 Point  CAD History:         1 Point   MI; Positive Stress Test; CP due to Mi;  Nitrate Usage to control Angina; Pathologic Q wave on EKG  CHF Active:         1 Point   Pulm Edema; Paroxysmal Nocturnal Dyspnea;  Bibasilar Rales (crackles);S3; CHF on CXR  Cerebrovascular Disease (TIA or CVA):     1 Point  DM on Insulin:        1 Point  Serum Creat >2.0 mg/dl:       1 Point          Total Points: 0     Scorin: Class I, Very Low Risk (0.4%)     1: Class II, Low risk (0.9%)     2: Class III Moderate (6.6%)     3: Class IV High (>11%)      MONICA Risk:  GFR:        For PCP:  If GFR>60, Hold ACE/ARB/Diuretic on the day of surgery, and NSAIDS 10 days before.    If GFR<45, Consider PRE and POST op Nephrology Consult.    If 46 <GFR> 59 : Has Patient had MONICA in last 6 Months? no   If YES: Preop Nephrology consult   If No:  Post Op Nephrology consult.           Assessment/Plan:    Patient is medically optimized (cleared) for the planned procedure.    Further testing/evaluation is not required.    Postop concerns: yes    Problem List Items Addressed This Visit    None         No outpatient medications have been marked as taking for the 24 encounter (Appointment) with [facility]OC Bates.       NOTE: Please use the above to review important meds for your specialty, the remainder \"per anesthesia Guidelines.\"    NOTE: Please place an Inbasket message for \"SOC\" pool for complicated patients.              Review of Systems   Constitutional: Negative.  Negative for chills and fever.   HENT: Negative.  Negative for ear pain and sore throat.    Eyes:  Negative for pain and visual disturbance.   Respiratory: Negative.  Negative for cough and shortness of breath.    Cardiovascular: Negative.  Negative for chest pain and palpitations.   Gastrointestinal: Negative.  Negative for abdominal pain and vomiting.   Genitourinary: " "Negative.  Negative for dysuria and hematuria.   Musculoskeletal:  Negative for arthralgias and back pain.        Left knee pain   Skin:  Negative for color change and rash.   Neurological: Negative.  Negative for seizures and syncope.   All other systems reviewed and are negative.      Objective   /72 (BP Location: Right arm, Patient Position: Sitting, Cuff Size: Large)   Pulse 62   Temp 98.4 °F (36.9 °C)   Ht 5' 6\" (1.676 m)   Wt 97.3 kg (214 lb 9.6 oz)   SpO2 99%   BMI 34.64 kg/m²      Physical Exam  Vitals and nursing note reviewed.   Constitutional:       General: She is not in acute distress.     Appearance: She is well-developed.   HENT:      Head: Normocephalic and atraumatic.   Eyes:      Conjunctiva/sclera: Conjunctivae normal.   Cardiovascular:      Rate and Rhythm: Normal rate and regular rhythm.      Heart sounds: No murmur heard.  Pulmonary:      Effort: Pulmonary effort is normal. No respiratory distress.      Breath sounds: Normal breath sounds.   Abdominal:      Palpations: Abdomen is soft.      Tenderness: There is no abdominal tenderness.   Musculoskeletal:         General: No swelling.      Cervical back: Neck supple.   Skin:     General: Skin is warm and dry.      Capillary Refill: Capillary refill takes less than 2 seconds.   Neurological:      Mental Status: She is alert.   Psychiatric:         Mood and Affect: Mood normal.       Administrative Statements   I have spent a total time of 36 minutes in caring for this patient on the day of the visit/encounter including Risks and benefits of tx options, Instructions for management, Documenting in the medical record, Reviewing / ordering tests, medicine, procedures  , and Obtaining or reviewing history  .   "

## 2024-12-18 PROBLEM — Z01.818 PREOP EXAMINATION: Status: ACTIVE | Noted: 2024-12-18

## 2024-12-18 NOTE — ASSESSMENT & PLAN NOTE
Patient is medically optimized (cleared) for the planned procedure.  According to the Detsky Cardiac Risk Index, the patient has a low chance of cardiac complications during this non-cardiac surgery. Physical exam was performed.  Patient has been medically optimized for the procedure.  The surgeon and anesthesiologist are to further explain the risks and benefits of the procedure to the patient.    Further testing/evaluation is not required.  Postop concerns: no

## 2025-01-18 DIAGNOSIS — I10 ESSENTIAL (PRIMARY) HYPERTENSION: ICD-10-CM

## 2025-01-20 RX ORDER — AMLODIPINE BESYLATE 5 MG/1
5 TABLET ORAL DAILY
Qty: 90 TABLET | Refills: 1 | Status: SHIPPED | OUTPATIENT
Start: 2025-01-20

## 2025-01-21 DIAGNOSIS — J45.20 MILD INTERMITTENT ASTHMA, UNSPECIFIED WHETHER COMPLICATED: ICD-10-CM

## 2025-01-21 DIAGNOSIS — E03.9 HYPOTHYROIDISM, UNSPECIFIED TYPE: ICD-10-CM

## 2025-01-21 RX ORDER — LEVOTHYROXINE SODIUM 25 UG/1
25 TABLET ORAL DAILY
Qty: 90 TABLET | Refills: 1 | Status: SHIPPED | OUTPATIENT
Start: 2025-01-21 | End: 2025-01-21 | Stop reason: SDUPTHER

## 2025-01-21 RX ORDER — BECLOMETHASONE DIPROPIONATE HFA 80 UG/1
2 AEROSOL, METERED RESPIRATORY (INHALATION) 2 TIMES DAILY
Qty: 10.6 G | Refills: 0 | Status: SHIPPED | OUTPATIENT
Start: 2025-01-21

## 2025-01-21 RX ORDER — MONTELUKAST SODIUM 10 MG/1
10 TABLET ORAL
Qty: 90 TABLET | Refills: 0 | Status: SHIPPED | OUTPATIENT
Start: 2025-01-21

## 2025-01-22 RX ORDER — LEVOTHYROXINE SODIUM 25 UG/1
25 TABLET ORAL DAILY
Qty: 90 TABLET | Refills: 1 | Status: SHIPPED | OUTPATIENT
Start: 2025-01-22

## 2025-01-27 RX ORDER — CYCLOBENZAPRINE HCL 5 MG
5 TABLET ORAL 3 TIMES DAILY PRN
COMMUNITY
Start: 2025-01-10 | End: 2025-01-29 | Stop reason: ALTCHOICE

## 2025-01-27 RX ORDER — MELOXICAM 15 MG/1
TABLET ORAL
COMMUNITY
Start: 2025-01-10

## 2025-01-27 RX ORDER — ASPIRIN 81 MG/1
TABLET ORAL
COMMUNITY
Start: 2025-01-10

## 2025-01-27 RX ORDER — OXYCODONE HYDROCHLORIDE 5 MG/1
TABLET, COATED ORAL
COMMUNITY
Start: 2025-01-10

## 2025-01-27 RX ORDER — OXYCODONE HYDROCHLORIDE 5 MG/1
5-10 TABLET ORAL EVERY 4 HOURS PRN
COMMUNITY
Start: 2025-01-10

## 2025-01-27 RX ORDER — PANTOPRAZOLE SODIUM 40 MG/1
TABLET, DELAYED RELEASE ORAL
COMMUNITY
Start: 2025-01-10

## 2025-01-27 RX ORDER — DOCUSATE SODIUM 100 MG/1
1 CAPSULE, LIQUID FILLED ORAL 2 TIMES DAILY
COMMUNITY
Start: 2025-01-10

## 2025-01-28 ENCOUNTER — OFFICE VISIT (OUTPATIENT)
Age: 60
End: 2025-01-28

## 2025-01-28 VITALS
HEIGHT: 66 IN | BODY MASS INDEX: 35.45 KG/M2 | SYSTOLIC BLOOD PRESSURE: 128 MMHG | HEART RATE: 82 BPM | TEMPERATURE: 98.5 F | WEIGHT: 220.6 LBS | OXYGEN SATURATION: 98 % | DIASTOLIC BLOOD PRESSURE: 82 MMHG

## 2025-01-28 DIAGNOSIS — E03.9 HYPOTHYROIDISM, UNSPECIFIED TYPE: Primary | ICD-10-CM

## 2025-01-28 DIAGNOSIS — R79.81 LOW OXYGEN SATURATION: ICD-10-CM

## 2025-01-28 PROCEDURE — 99214 OFFICE O/P EST MOD 30 MIN: CPT | Performed by: NURSE PRACTITIONER

## 2025-01-28 NOTE — PROGRESS NOTES
Name: Pam Montalvo      : 1965      MRN: [id number]  Encounter Provider: [facility] Bates  Encounter Date: 2025   Encounter department: [facility] IN [facility]IP [facility]  :  Assessment & Plan  Hypothyroidism, unspecified type  Continue on levothyroxine. Will continue to monitor labs.       Low oxygen saturation  Pt referred to respiratory due to low 02 during sleep, most notable recorded on over night stay in hospital.   Orders:  •  Ambulatory Referral to Pulmonology; Future    BMI 35.0-35.9,adult  Goal to consume 500 to 1,000 fewer calories per day for a 1-2 lbs weight loss per week. Increase exercise to 30 min, 5 times a week as tolerated for a goal of 150 mins a week. Calorie tracking apps such as myfitness pal can be helpful for keeping track of calorie intake. Decrease simple carbohydrates (white bread, pasta, white rice), and increasing vegetables, fruit, and protein.  Increase water.             BMI Counseling: Body mass index is 35.61 kg/m². The BMI is above normal. Nutrition recommendations include decreasing portion sizes and encouraging healthy choices of fruits and vegetables. Exercise recommendations include moderate physical activity 150 minutes/week. Rationale for BMI follow-up plan is due to patient being overweight or obese.     Depression Screening and Follow-up Plan: Patient was screened for depression during today's encounter. They screened negative with a PHQ-2 score of 0.      History of Present Illness   Pt here today for f/u. Pt recently had a TKR and it was noted that during her over night stay her O2 levels dropped while she was sleeping. Pt reports that when she woke up her O2 levels again increased. Pt states her smart watch has been tracking that she has been having drops in O2 levels over night. Pt reporting O2 drops into the mid to low 80's at during sleep, with occasional o2 levels in the 70s. She denies snoring or  "gasping for air at night. She does not feel well rested when sleeping.      Review of Systems   Constitutional: Negative.  Negative for chills and fever.   HENT: Negative.  Negative for ear pain and sore throat.    Eyes:  Negative for pain and visual disturbance.   Respiratory: Negative.  Negative for cough and shortness of breath.    Cardiovascular:  Negative for chest pain and palpitations.   Gastrointestinal:  Negative for abdominal pain and vomiting.   Genitourinary:  Negative for dysuria and hematuria.   Musculoskeletal:  Negative for arthralgias and back pain.   Skin:  Negative for color change and rash.   Neurological: Negative.  Negative for seizures and syncope.   All other systems reviewed and are negative.      Objective   /82 (BP Location: Left arm, Patient Position: Sitting)   Pulse 82   Temp 98.5 °F (36.9 °C)   Ht 5' 6\" (1.676 m)   Wt 100 kg (220 lb 9.6 oz)   SpO2 98%   BMI 35.61 kg/m²      Physical Exam  Vitals and nursing note reviewed.   Constitutional:       General: She is not in acute distress.     Appearance: She is well-developed.   HENT:      Head: Normocephalic and atraumatic.      Right Ear: Tympanic membrane, ear canal and external ear normal.      Left Ear: Tympanic membrane, ear canal and external ear normal.      Nose: Nose normal.      Mouth/Throat:      Mouth: Mucous membranes are moist.      Pharynx: Oropharynx is clear.   Eyes:      General:         Right eye: No discharge.         Left eye: No discharge.      Conjunctiva/sclera: Conjunctivae normal.      Pupils: Pupils are equal, round, and reactive to light.   Cardiovascular:      Rate and Rhythm: Normal rate and regular rhythm.      Heart sounds: No murmur heard.  Pulmonary:      Effort: Pulmonary effort is normal. No respiratory distress.      Breath sounds: Normal breath sounds. No stridor. No wheezing, rhonchi or rales.   Chest:      Chest wall: No tenderness.   Abdominal:      General: Bowel sounds are normal.      " Palpations: Abdomen is soft.      Tenderness: There is no abdominal tenderness.   Musculoskeletal:         General: No swelling.      Cervical back: Neck supple.      Right lower leg: No edema.      Left lower leg: No edema.   Skin:     General: Skin is warm and dry.      Capillary Refill: Capillary refill takes less than 2 seconds.   Neurological:      Mental Status: She is alert and oriented to person, place, and time.   Psychiatric:         Mood and Affect: Mood normal.         Behavior: Behavior normal.         Thought Content: Thought content normal.         Judgment: Judgment normal.       Administrative Statements   I have spent a total time of 36 minutes in caring for this patient on the day of the visit/encounter including Risks and benefits of tx options, Instructions for management, Documenting in the medical record, Reviewing / ordering tests, medicine, procedures  , and Obtaining or reviewing history  .

## 2025-01-29 NOTE — ASSESSMENT & PLAN NOTE
Goal to consume 500 to 1,000 fewer calories per day for a 1-2 lbs weight loss per week. Increase exercise to 30 min, 5 times a week as tolerated for a goal of 150 mins a week. Calorie tracking apps such as myfitness pal can be helpful for keeping track of calorie intake. Decrease simple carbohydrates (white bread, pasta, white rice), and increasing vegetables, fruit, and protein.  Increase water.

## 2025-01-29 NOTE — PATIENT INSTRUCTIONS
"Patient Education     Hypothyroidism (underactive thyroid)   The Basics   Written by the doctors and editors at [facility]   What is hypothyroidism? -- Hypothyroidism happens when a gland in your neck, called the thyroid gland, makes too little thyroid hormone. This hormone controls how the body uses and stores energy (figure 1).  With a different condition, hyperthyroidism, the thyroid gland makes too much thyroid hormone. With hypothyroidism, it does not make enough. Doctors sometimes also use the term \"underactive thyroid.\"  What causes hypothyroidism? -- Different things can cause the thyroid gland to be unable to make enough thyroid hormone. These include:   Problems with the immune system - The immune system is the body's infection-fighting system. Sometimes, a person's immune system attacks healthy cells, such as cells in the thyroid. This is called \"chronic autoimmune thyroiditis\" or \"Hashimoto's thyroiditis.\" It is the most common cause of hypothyroidism in the .   Thyroidectomy - This is surgery to remove the thyroid gland.   Radioiodine therapy - This is a treatment used for hyperthyroidism. It often causes hypothyroidism because it destroys part of the thyroid gland.   Radiation in the neck area - High doses of radiation (for example, to treat cancer) can damage the thyroid gland.   Certain medicines  The treatment of hypothyroidism is the same no matter what caused it.  What are the symptoms of hypothyroidism? -- Some people with hypothyroidism have no symptoms. But most people feel tired. That can make it hard to know if a person has it, because a lot of conditions can make you tired.  Other symptoms of hypothyroidism include:   Lack of energy   Getting cold easily   Developing coarse or thin hair   Getting constipated (having too few bowel movements)  If it is not treated, hypothyroidism can also weaken and slow your heart. This can make you feel out of breath or tired when you exercise. It can also " cause swelling (fluid buildup) in your ankles. Untreated hypothyroidism can also increase your blood pressure and raise your cholesterol. Both of these things increase the risk of heart problems.  Hypothyroidism can disrupt monthly periods. It can also make it hard to get pregnant. In people who do get pregnant, hypothyroidism can cause problems. For instance, it can increase the chances of having a miscarriage. (A miscarriage is when a pregnancy ends on its own before 20 weeks.)  Is there a test for hypothyroidism? -- Yes. Your doctor or nurse can check for hypothyroidism using a simple blood test.  How is hypothyroidism treated? -- Treatment involves taking thyroid hormone pills every day. After you take the pills for about 6 weeks, your doctor or nurse will test your blood again. This is to make sure that the levels are where they should be. They might adjust your dose depending on the results. Most people with hypothyroidism need to keep taking thyroid pills for the rest of their life. This gives your body the right level of the hormone that it cannot make on its own.  Thyroid hormone pills come in different brand name and generic forms. All of the pills work equally well. If possible, stick with the same generic or brand name. But switching between pills does not cause problems for most people. Talk to your doctor or nurse if you want to switch for some reason.  Never change your dose of thyroid hormone on your own. Taking too much thyroid hormone can cause heart rhythm problems and even damage your bones.  What if I want to get pregnant? -- You can try to get pregnant. Many people with hypothyroidism have healthy pregnancies. But your doctor or nurse will most likely need to change your dose of thyroid hormone once you are pregnant. That's because you need more thyroid hormone during pregnancy. They will also measure your levels of thyroid hormone 4 weeks after any change in your dose, and at least once during  each trimester of pregnancy.  All topics are updated as new evidence becomes available and our peer review process is complete.  This topic retrieved from [employer] on: Feb 26, 2024.  Topic 31926 Version 11.0  Release: 32.2.4 - C32.56  © 2024 [employer], Inc. and/or its affiliates. All rights reserved.  figure 1: Thyroid and parathyroid glands     The thyroid is a butterfly-shaped gland in the middle of the neck. It sits just below the larynx (voice box). The thyroid makes 2 hormones, called T3 and T4, which control how the body uses and stores energy. The parathyroid glands are 4 small glands behind the thyroid. They make a hormone called parathyroid hormone, which helps control the amount of calcium in the blood.  Graphic 91072 Version 10.0  Consumer Information Use and Disclaimer   Disclaimer: This generalized information is a limited summary of diagnosis, treatment, and/or medication information. It is not meant to be comprehensive and should be used as a tool to help the user understand and/or assess potential diagnostic and treatment options. It does NOT include all information about conditions, treatments, medications, side effects, or risks that may apply to a specific patient. It is not intended to be medical advice or a substitute for the medical advice, diagnosis, or treatment of a health care provider based on the health care provider's examination and assessment of a patient's specific and unique circumstances. Patients must speak with a health care provider for complete information about their health, medical questions, and treatment options, including any risks or benefits regarding use of medications. This information does not endorse any treatments or medications as safe, effective, or approved for treating a specific patient. Up[employer], Inc. and its affiliates disclaim any warranty or liability relating to this information or the use thereof.The use of this information is governed by the Terms of Use,  available at https://www.[employer].com/en/know/clinical-effectiveness-terms. 2024© [employer] and its affiliates and/or licensors. All rights reserved.  Copyright   © 2024 [employer] and/or its affiliates. All rights reserved.

## 2025-02-17 DIAGNOSIS — R01.1 MURMUR, CARDIAC: Primary | ICD-10-CM

## 2025-02-17 DIAGNOSIS — R79.81 LOW OXYGEN SATURATION: ICD-10-CM

## 2025-02-23 DIAGNOSIS — J45.20 MILD INTERMITTENT ASTHMA, UNSPECIFIED WHETHER COMPLICATED: ICD-10-CM

## 2025-02-24 RX ORDER — BECLOMETHASONE DIPROPIONATE HFA 80 UG/1
2 AEROSOL, METERED RESPIRATORY (INHALATION) 2 TIMES DAILY
Qty: 10.6 G | Refills: 0 | Status: SHIPPED | OUTPATIENT
Start: 2025-02-24

## 2025-03-25 ENCOUNTER — RESULTS FOLLOW-UP (OUTPATIENT)
Age: 60
End: 2025-03-25

## 2025-03-30 DIAGNOSIS — J45.20 MILD INTERMITTENT ASTHMA, UNSPECIFIED WHETHER COMPLICATED: ICD-10-CM

## 2025-03-31 RX ORDER — BECLOMETHASONE DIPROPIONATE HFA 80 UG/1
2 AEROSOL, METERED RESPIRATORY (INHALATION) 2 TIMES DAILY
Qty: 10.6 G | Refills: 5 | Status: SHIPPED | OUTPATIENT
Start: 2025-03-31

## 2025-04-17 DIAGNOSIS — J45.20 MILD INTERMITTENT ASTHMA, UNSPECIFIED WHETHER COMPLICATED: ICD-10-CM

## 2025-04-17 RX ORDER — MONTELUKAST SODIUM 10 MG/1
10 TABLET ORAL
Qty: 90 TABLET | Refills: 0 | Status: SHIPPED | OUTPATIENT
Start: 2025-04-17

## 2025-05-21 DIAGNOSIS — J45.20 MILD INTERMITTENT ASTHMA, UNSPECIFIED WHETHER COMPLICATED: ICD-10-CM

## 2025-05-21 RX ORDER — BECLOMETHASONE DIPROPIONATE HFA 80 UG/1
2 AEROSOL, METERED RESPIRATORY (INHALATION) 2 TIMES DAILY
Qty: 10.6 G | Refills: 5 | Status: SHIPPED | OUTPATIENT
Start: 2025-05-21

## 2025-07-08 ENCOUNTER — TELEPHONE (OUTPATIENT)
Dept: FAMILY MEDICINE CLINIC | Facility: CLINIC | Age: 60
End: 2025-07-08

## 2025-07-08 NOTE — TELEPHONE ENCOUNTER
PT left a voicemail regarding appt rescheduling:    hi my name is kerwin diego that's ROEL my YOB: 1965 i'm calling because before i went on vacation i received a message that that manuel is no longer at [facility] in [place] and that my appointment was cancelled with her for july 29th and that i need to reschedule my appointment with doctor murphy in [place] so that's why i was calling with to reschedule my appointment that i had with manuel on july 29th and my telephone number is [phone] thank you have a good day bye bye     LM for PT to return call.

## 2025-07-13 DIAGNOSIS — J45.20 MILD INTERMITTENT ASTHMA, UNSPECIFIED WHETHER COMPLICATED: ICD-10-CM

## 2025-07-14 DIAGNOSIS — I10 ESSENTIAL (PRIMARY) HYPERTENSION: ICD-10-CM

## 2025-07-14 RX ORDER — MONTELUKAST SODIUM 10 MG/1
10 TABLET ORAL
Qty: 90 TABLET | Refills: 0 | Status: SHIPPED | OUTPATIENT
Start: 2025-07-14

## 2025-07-14 NOTE — TELEPHONE ENCOUNTER
Requested medication(s) are due for refill today: Yes  Patient has already received a courtesy refill: No  Other reason request has been forwarded to provider:   
no

## 2025-07-15 RX ORDER — AMLODIPINE BESYLATE 5 MG/1
5 TABLET ORAL DAILY
Qty: 90 TABLET | Refills: 0 | Status: SHIPPED | OUTPATIENT
Start: 2025-07-15